# Patient Record
Sex: FEMALE | Race: BLACK OR AFRICAN AMERICAN | NOT HISPANIC OR LATINO | Employment: FULL TIME | URBAN - METROPOLITAN AREA
[De-identification: names, ages, dates, MRNs, and addresses within clinical notes are randomized per-mention and may not be internally consistent; named-entity substitution may affect disease eponyms.]

---

## 2018-05-01 ENCOUNTER — OFFICE VISIT (OUTPATIENT)
Dept: FAMILY MEDICINE CLINIC | Facility: CLINIC | Age: 48
End: 2018-05-01
Payer: COMMERCIAL

## 2018-05-01 VITALS
SYSTOLIC BLOOD PRESSURE: 114 MMHG | DIASTOLIC BLOOD PRESSURE: 78 MMHG | HEART RATE: 72 BPM | TEMPERATURE: 97.8 F | HEIGHT: 69 IN | RESPIRATION RATE: 14 BRPM | BODY MASS INDEX: 26.13 KG/M2 | WEIGHT: 176.4 LBS

## 2018-05-01 DIAGNOSIS — L50.8 CHRONIC URTICARIA: ICD-10-CM

## 2018-05-01 DIAGNOSIS — I10 ESSENTIAL HYPERTENSION: Primary | ICD-10-CM

## 2018-05-01 DIAGNOSIS — R23.2 HOT FLASHES: ICD-10-CM

## 2018-05-01 PROCEDURE — 3008F BODY MASS INDEX DOCD: CPT | Performed by: FAMILY MEDICINE

## 2018-05-01 PROCEDURE — 3074F SYST BP LT 130 MM HG: CPT | Performed by: FAMILY MEDICINE

## 2018-05-01 PROCEDURE — 99203 OFFICE O/P NEW LOW 30 MIN: CPT | Performed by: FAMILY MEDICINE

## 2018-05-01 PROCEDURE — 3078F DIAST BP <80 MM HG: CPT | Performed by: FAMILY MEDICINE

## 2018-05-01 RX ORDER — DESLORATADINE 5 MG/1
5 TABLET ORAL DAILY
Refills: 4 | COMMUNITY
Start: 2018-03-09 | End: 2020-03-23 | Stop reason: SDUPTHER

## 2018-05-01 RX ORDER — CLOBETASOL PROPIONATE 0.46 MG/ML
SOLUTION TOPICAL 2 TIMES DAILY
COMMUNITY
End: 2018-11-02 | Stop reason: ALTCHOICE

## 2018-05-01 RX ORDER — LISINOPRIL 20 MG/1
20 TABLET ORAL DAILY
Refills: 2 | COMMUNITY
Start: 2018-03-06 | End: 2019-04-01 | Stop reason: ALTCHOICE

## 2018-05-01 RX ORDER — TRETINOIN 0.5 MG/G
CREAM TOPICAL
COMMUNITY
End: 2019-04-01

## 2018-05-01 NOTE — PROGRESS NOTES
Patient ID: Bishnu Mantilla is a 46 y o  female  HPI: 46 y  o female presents to get established into practice  She has been having chronic urticaria for past 3 years, hot flashes even though she had a hysterectomy about 5 years ago and was taken off hormone replacement several years ago  She had genetic testing since her mother came from a family of 21 siblings with multiple cases of cancer and she tested negative  She recently had extensive labs by primary care, allergists, and ENT  She will be getting records for me  She is scheduled for a colonscopy as well  SUBJECTIVE    Family History   Problem Relation Age of Onset    Hypertension Mother     Hypertension Father     Breast cancer Maternal Aunt     Ovarian cancer Maternal Aunt     Colon cancer Maternal Uncle      Social History     Social History    Marital status: Single     Spouse name: N/A    Number of children: N/A    Years of education: N/A     Occupational History    Not on file       Social History Main Topics    Smoking status: Not on file    Smokeless tobacco: Not on file    Alcohol use Not on file    Drug use: Unknown    Sexual activity: Not on file     Other Topics Concern    Not on file     Social History Narrative    No narrative on file     Past Medical History:   Diagnosis Date    Acne     Allergic rhinitis     Chronic urticaria     Hypertension      Past Surgical History:   Procedure Laterality Date    APPENDECTOMY      HYSTERECTOMY      TOTAL ABDOMINAL HYSTERECTOMY W/ BILATERAL SALPINGOOPHORECTOMY       Allergies   Allergen Reactions    Morphine Rash       Current Outpatient Prescriptions:     clobetasol (TEMOVATE) 0 05 % external solution, Apply topically 2 (two) times a day, Disp: , Rfl:     desloratadine (CLARINEX) 5 MG tablet, Take 5 mg by mouth daily, Disp: , Rfl: 4    lisinopril (ZESTRIL) 10 mg tablet, Take 10 mg by mouth daily, Disp: , Rfl: 2    tretinoin (REFISSA) 0 05 % cream, Apply topically daily at bedtime, Disp: , Rfl:     Review of Systems  Constitutional:     Denies fever, chills ,fatigue ,weakness ,weight loss, weight gain     ENT: Denies earache ,loss of hearing ,nosebleed, nasal discharge,nasal congestion ,sore throat ,hoarseness  Pulmonary: Denies shortness of breath ,cough  ,dyspnea on exertion, orthopnea  ,PND   Cardiovascular:  Denies bradycardia , tachycardia  ,palpations, lower extremity edema leg, claudication  Breast:  Denies new or changing breast lumps ,nipple discharge ,nipple changes  Abdomen:  Denies abdominal pain , anorexia , indigestion, nausea, vomiting, constipation, diarrhea  Musculoskeletal: Denies myalgias, arthralgias, joint swelling, joint stiffness , limb pain, limb swelling  Gu: denies dysuria, polyuria  Skin: Denies skin rash, skin lesion, skin wound, itching, dry skin + urticaria  Neuro: Denies headache, numbness, tingling, confusion, loss of consciousness, dizziness, vertigo  Psychiatric: Denies feelings of depression, suicidal ideation, anxiety, sleep disturbances  + hot flashes   OBJECTIVE    Constitutional:   NAD, well appearing and well nourished      ENT:   Conjunctiva and lids: no injection, edema, or discharge     Pupils and iris: STALIN bilaterally    External inspection of ears and nose: normal without deformities or discharge  Otoscopic exam: Canals patent without erythema  Nasal mucosa, septum and turbinates: Normal or edema or discharge         Oropharynx:  Moist mucosa, normal tongue and tonsils without lesions  No erythema        Pulmonary:Respiratory effort normal rate and rhythm, no increased work of breathing   Auscultation of lungs:  Clear bilaterally with no adventitious breath sounds       Cardiovascular: regular rate and rhythm, S1 and S2, no murmur, no edema and/or varicosities of LE      Abdomen: Soft and non-distended     Positive bowel sounds      No heptomegaly or splenomegaly      Gu: no suprapubic tenderness or CVA tenderness, no urethral discharge  Lymphatic:  No anterior or posterior cervical lymphadenopathy         Musculoskeletal:  Gait and station: Normal gait      Digits and nails normal without clubbing or cyanosis       Inspection/palpation of joints, bones, and muscles:  No joint tenderness, swelling, full active and passive range of motion       Skin: Normal skin turgor and no rashes      Neuro:       Normal reflexes       Psych:   alert and oriented to person, place and time     normal mood and affect       Assessment/Plan:There are no diagnoses linked to this encounter  I will review pending labs and give pt my opinion regarding urticaria and hot flashes pending results

## 2018-06-25 ENCOUNTER — RX ONLY (RX ONLY)
Age: 48
End: 2018-06-25

## 2018-06-25 ENCOUNTER — DOCTOR'S OFFICE (OUTPATIENT)
Dept: URBAN - METROPOLITAN AREA CLINIC 137 | Facility: CLINIC | Age: 48
Setting detail: OPHTHALMOLOGY
End: 2018-06-25
Payer: COMMERCIAL

## 2018-06-25 DIAGNOSIS — H00.014: ICD-10-CM

## 2018-06-25 PROCEDURE — 99203 OFFICE O/P NEW LOW 30 MIN: CPT | Performed by: OPHTHALMOLOGY

## 2018-06-25 ASSESSMENT — CONFRONTATIONAL VISUAL FIELD TEST (CVF)
OD_FINDINGS: FULL
OS_FINDINGS: FULL

## 2018-06-26 ASSESSMENT — VISUAL ACUITY
OD_BCVA: 20/20
OS_BCVA: 20/20

## 2018-06-26 ASSESSMENT — REFRACTION_CURRENTRX
OS_OVR_VA: 20/
OD_OVR_VA: 20/
OD_OVR_VA: 20/
OS_OVR_VA: 20/
OS_OVR_VA: 20/
OD_OVR_VA: 20/

## 2018-06-26 ASSESSMENT — REFRACTION_MANIFEST
OD_VA2: 20/
OS_VA2: 20/
OD_VA3: 20/
OS_VA1: 20/
OU_VA: 20/
OD_VA1: 20/
OS_VA2: 20/
OS_VA1: 20/
OD_VA3: 20/
OD_VA1: 20/
OS_VA3: 20/
OS_VA1: 20/
OS_VA3: 20/
OS_VA2: 20/
OU_VA: 20/
OD_VA1: 20/
OD_VA2: 20/
OU_VA: 20/
OS_VA3: 20/
OD_VA3: 20/
OD_VA2: 20/

## 2018-08-08 ENCOUNTER — DOCTOR'S OFFICE (OUTPATIENT)
Dept: URBAN - METROPOLITAN AREA CLINIC 137 | Facility: CLINIC | Age: 48
Setting detail: OPHTHALMOLOGY
End: 2018-08-08
Payer: COMMERCIAL

## 2018-08-08 DIAGNOSIS — H04.123: ICD-10-CM

## 2018-08-08 PROCEDURE — 92014 COMPRE OPH EXAM EST PT 1/>: CPT | Performed by: OPHTHALMOLOGY

## 2018-08-08 ASSESSMENT — REFRACTION_MANIFEST
OD_VA3: 20/
OD_VA1: 20/
OS_VA1: 20/
OU_VA: 20/
OS_VA1: 20/
OD_VA2: 20/
OU_VA: 20/
OD_VA1: 20/
OD_VA2: 20/
OS_VA3: 20/
OD_VA2: 20/
OD_VA3: 20/
OS_VA3: 20/
OS_VA2: 20/
OS_VA2: 20/
OS_VA3: 20/
OD_VA1: 20/
OS_VA2: 20/
OD_VA3: 20/
OU_VA: 20/
OS_VA1: 20/

## 2018-08-08 ASSESSMENT — VISUAL ACUITY
OS_BCVA: 20/20
OD_BCVA: 20/20-2

## 2018-08-08 ASSESSMENT — REFRACTION_CURRENTRX
OS_SPHERE: -2.00
OD_VPRISM_DIRECTION: SV
OD_OVR_VA: 20/
OD_OVR_VA: 20/
OS_CYLINDER: SPH
OD_CYLINDER: SPH
OD_OVR_VA: 20/
OS_OVR_VA: 20/
OD_SPHERE: -1.75
OS_VPRISM_DIRECTION: SV

## 2018-08-08 ASSESSMENT — REFRACTION_AUTOREFRACTION
OD_SPHERE: -1.50
OS_CYLINDER: +0.25
OS_AXIS: 131
OD_CYLINDER: SPH
OS_SPHERE: -2.25

## 2018-08-08 ASSESSMENT — CONFRONTATIONAL VISUAL FIELD TEST (CVF)
OS_FINDINGS: FULL
OD_FINDINGS: FULL

## 2018-08-08 ASSESSMENT — SPHEQUIV_DERIVED: OS_SPHEQUIV: -2.125

## 2018-08-09 ENCOUNTER — OPTICAL OFFICE (OUTPATIENT)
Dept: URBAN - METROPOLITAN AREA CLINIC 146 | Facility: CLINIC | Age: 48
Setting detail: OPHTHALMOLOGY
End: 2018-08-09
Payer: COMMERCIAL

## 2018-08-09 DIAGNOSIS — H52.13: ICD-10-CM

## 2018-08-09 PROCEDURE — V2020 VISION SVCS FRAMES PURCHASES: HCPCS | Performed by: OPHTHALMOLOGY

## 2018-08-09 PROCEDURE — V2100 LENS SPHER SINGLE PLANO 4.00: HCPCS | Performed by: OPHTHALMOLOGY

## 2018-08-09 PROCEDURE — V2750 ANTI-REFLECTIVE COATING: HCPCS | Performed by: OPHTHALMOLOGY

## 2018-09-20 ENCOUNTER — DOCTOR'S OFFICE (OUTPATIENT)
Dept: URBAN - METROPOLITAN AREA CLINIC 136 | Facility: CLINIC | Age: 48
Setting detail: OPHTHALMOLOGY
End: 2018-09-20
Payer: COMMERCIAL

## 2018-09-20 DIAGNOSIS — H01.004: ICD-10-CM

## 2018-09-20 DIAGNOSIS — H01.001: ICD-10-CM

## 2018-09-20 DIAGNOSIS — H25.13: ICD-10-CM

## 2018-09-20 DIAGNOSIS — H05.89: ICD-10-CM

## 2018-09-20 DIAGNOSIS — H04.123: ICD-10-CM

## 2018-09-20 DIAGNOSIS — H10.13: ICD-10-CM

## 2018-09-20 PROCEDURE — 92012 INTRM OPH EXAM EST PATIENT: CPT | Performed by: OPHTHALMOLOGY

## 2018-09-20 ASSESSMENT — SUPERFICIAL PUNCTATE KERATITIS (SPK)
OS_SPK: ABSENT
OD_SPK: ABSENT

## 2018-09-20 ASSESSMENT — REFRACTION_CURRENTRX
OS_VPRISM_DIRECTION: SV
OS_OVR_VA: 20/
OD_VPRISM_DIRECTION: SV
OD_OVR_VA: 20/
OD_OVR_VA: 20/
OD_SPHERE: -1.75
OD_OVR_VA: 20/
OD_CYLINDER: SPH
OS_OVR_VA: 20/
OS_CYLINDER: SPH
OS_SPHERE: -2.00
OS_OVR_VA: 20/

## 2018-09-20 ASSESSMENT — LID EXAM ASSESSMENTS
OS_BLEPHARITIS: LUL 1+
OD_BLEPHARITIS: RUL 1+

## 2018-09-20 ASSESSMENT — CONFRONTATIONAL VISUAL FIELD TEST (CVF)
OD_FINDINGS: FULL
OS_FINDINGS: FULL

## 2018-09-20 ASSESSMENT — REFRACTION_MANIFEST
OD_VA3: 20/
OS_VA1: 20/
OD_VA1: 20/
OU_VA: 20/
OD_VA2: 20/
OD_VA1: 20/
OS_VA3: 20/
OS_VA3: 20/
OS_VA2: 20/
OD_VA3: 20/
OS_VA2: 20/
OS_VA1: 20/
OU_VA: 20/
OD_VA2: 20/

## 2018-09-20 ASSESSMENT — VISUAL ACUITY
OD_BCVA: 20/20-
OS_BCVA: 20/20

## 2018-09-20 ASSESSMENT — REFRACTION_AUTOREFRACTION
OS_AXIS: 131
OD_CYLINDER: SPH
OS_SPHERE: -2.25
OD_SPHERE: -1.50
OS_CYLINDER: +0.25

## 2018-09-20 ASSESSMENT — SPHEQUIV_DERIVED: OS_SPHEQUIV: -2.125

## 2018-11-02 ENCOUNTER — HOSPITAL ENCOUNTER (EMERGENCY)
Facility: HOSPITAL | Age: 48
Discharge: HOME | End: 2018-11-02
Attending: EMERGENCY MEDICINE | Admitting: EMERGENCY MEDICINE
Payer: COMMERCIAL

## 2018-11-02 ENCOUNTER — APPOINTMENT (EMERGENCY)
Dept: RADIOLOGY | Facility: HOSPITAL | Age: 48
End: 2018-11-02
Payer: COMMERCIAL

## 2018-11-02 ENCOUNTER — APPOINTMENT (EMERGENCY)
Dept: CT IMAGING | Facility: HOSPITAL | Age: 48
End: 2018-11-02
Payer: COMMERCIAL

## 2018-11-02 VITALS
HEART RATE: 78 BPM | SYSTOLIC BLOOD PRESSURE: 167 MMHG | TEMPERATURE: 98.2 F | WEIGHT: 174.16 LBS | BODY MASS INDEX: 25.72 KG/M2 | RESPIRATION RATE: 16 BRPM | DIASTOLIC BLOOD PRESSURE: 89 MMHG | OXYGEN SATURATION: 100 %

## 2018-11-02 DIAGNOSIS — F41.1 ANXIETY STATE: ICD-10-CM

## 2018-11-02 DIAGNOSIS — N28.1 RENAL CYST, RIGHT: ICD-10-CM

## 2018-11-02 DIAGNOSIS — R07.9 ACUTE CHEST PAIN: ICD-10-CM

## 2018-11-02 DIAGNOSIS — I16.0 HYPERTENSIVE URGENCY: Primary | ICD-10-CM

## 2018-11-02 LAB
ALBUMIN SERPL BCP-MCNC: 3.9 G/DL (ref 3.5–5)
ALP SERPL-CCNC: 76 U/L (ref 46–116)
ALT SERPL W P-5'-P-CCNC: 44 U/L (ref 12–78)
ANION GAP SERPL CALCULATED.3IONS-SCNC: 10 MMOL/L (ref 4–13)
APTT PPP: 29 SECONDS (ref 24–36)
AST SERPL W P-5'-P-CCNC: 14 U/L (ref 5–45)
BASOPHILS # BLD AUTO: 0.02 THOUSANDS/ΜL (ref 0–0.1)
BASOPHILS NFR BLD AUTO: 0 % (ref 0–1)
BILIRUB DIRECT SERPL-MCNC: 0.04 MG/DL (ref 0–0.2)
BILIRUB SERPL-MCNC: 0.2 MG/DL (ref 0.2–1)
BUN SERPL-MCNC: 13 MG/DL (ref 5–25)
CALCIUM SERPL-MCNC: 9.3 MG/DL (ref 8.3–10.1)
CHLORIDE SERPL-SCNC: 105 MMOL/L (ref 100–108)
CO2 SERPL-SCNC: 28 MMOL/L (ref 21–32)
CREAT SERPL-MCNC: 0.72 MG/DL (ref 0.6–1.3)
EOSINOPHIL # BLD AUTO: 0.21 THOUSAND/ΜL (ref 0–0.61)
EOSINOPHIL NFR BLD AUTO: 2 % (ref 0–6)
ERYTHROCYTE [DISTWIDTH] IN BLOOD BY AUTOMATED COUNT: 12.7 % (ref 11.6–15.1)
GFR SERPL CREATININE-BSD FRML MDRD: 113 ML/MIN/1.73SQ M
GLUCOSE SERPL-MCNC: 92 MG/DL (ref 65–140)
HCT VFR BLD AUTO: 40.1 % (ref 34.8–46.1)
HGB BLD-MCNC: 13.4 G/DL (ref 11.5–15.4)
IMM GRANULOCYTES # BLD AUTO: 0.03 THOUSAND/UL (ref 0–0.2)
IMM GRANULOCYTES NFR BLD AUTO: 0 % (ref 0–2)
INR PPP: 0.91 (ref 0.86–1.17)
LYMPHOCYTES # BLD AUTO: 3.14 THOUSANDS/ΜL (ref 0.6–4.47)
LYMPHOCYTES NFR BLD AUTO: 30 % (ref 14–44)
MCH RBC QN AUTO: 28.6 PG (ref 26.8–34.3)
MCHC RBC AUTO-ENTMCNC: 33.4 G/DL (ref 31.4–37.4)
MCV RBC AUTO: 86 FL (ref 82–98)
MONOCYTES # BLD AUTO: 0.77 THOUSAND/ΜL (ref 0.17–1.22)
MONOCYTES NFR BLD AUTO: 7 % (ref 4–12)
NEUTROPHILS # BLD AUTO: 6.36 THOUSANDS/ΜL (ref 1.85–7.62)
NEUTS SEG NFR BLD AUTO: 61 % (ref 43–75)
NRBC BLD AUTO-RTO: 0 /100 WBCS
NT-PROBNP SERPL-MCNC: 68 PG/ML
PLATELET # BLD AUTO: 226 THOUSANDS/UL (ref 149–390)
PMV BLD AUTO: 10.1 FL (ref 8.9–12.7)
POTASSIUM SERPL-SCNC: 3.7 MMOL/L (ref 3.5–5.3)
PROT SERPL-MCNC: 7.5 G/DL (ref 6.4–8.2)
PROTHROMBIN TIME: 12.4 SECONDS (ref 11.8–14.2)
RBC # BLD AUTO: 4.68 MILLION/UL (ref 3.81–5.12)
SODIUM SERPL-SCNC: 143 MMOL/L (ref 136–145)
TROPONIN I SERPL-MCNC: <0.02 NG/ML
WBC # BLD AUTO: 10.53 THOUSAND/UL (ref 4.31–10.16)

## 2018-11-02 PROCEDURE — 74175 CTA ABDOMEN W/CONTRAST: CPT

## 2018-11-02 PROCEDURE — 84484 ASSAY OF TROPONIN QUANT: CPT | Performed by: EMERGENCY MEDICINE

## 2018-11-02 PROCEDURE — 85610 PROTHROMBIN TIME: CPT | Performed by: EMERGENCY MEDICINE

## 2018-11-02 PROCEDURE — 36415 COLL VENOUS BLD VENIPUNCTURE: CPT | Performed by: EMERGENCY MEDICINE

## 2018-11-02 PROCEDURE — 71275 CT ANGIOGRAPHY CHEST: CPT

## 2018-11-02 PROCEDURE — 85730 THROMBOPLASTIN TIME PARTIAL: CPT | Performed by: EMERGENCY MEDICINE

## 2018-11-02 PROCEDURE — 80076 HEPATIC FUNCTION PANEL: CPT | Performed by: EMERGENCY MEDICINE

## 2018-11-02 PROCEDURE — 93005 ELECTROCARDIOGRAM TRACING: CPT

## 2018-11-02 PROCEDURE — 99285 EMERGENCY DEPT VISIT HI MDM: CPT

## 2018-11-02 PROCEDURE — 83880 ASSAY OF NATRIURETIC PEPTIDE: CPT | Performed by: EMERGENCY MEDICINE

## 2018-11-02 PROCEDURE — 71046 X-RAY EXAM CHEST 2 VIEWS: CPT

## 2018-11-02 PROCEDURE — 85025 COMPLETE CBC W/AUTO DIFF WBC: CPT | Performed by: EMERGENCY MEDICINE

## 2018-11-02 PROCEDURE — 80048 BASIC METABOLIC PNL TOTAL CA: CPT | Performed by: EMERGENCY MEDICINE

## 2018-11-02 RX ORDER — LABETALOL HYDROCHLORIDE 5 MG/ML
10 INJECTION, SOLUTION INTRAVENOUS ONCE
Status: DISCONTINUED | OUTPATIENT
Start: 2018-11-02 | End: 2018-11-02 | Stop reason: HOSPADM

## 2018-11-02 RX ADMIN — IOHEXOL 100 ML: 350 INJECTION, SOLUTION INTRAVENOUS at 16:06

## 2018-11-02 NOTE — ED PROVIDER NOTES
History  Chief Complaint   Patient presents with    Chest Pain     Pt c/o of left sided chest pain for the couple days  Pt states she was seen at [facility] and they did an EKG and then she waited in the waiting room so she left  History provided by:  Patient and relative  Chest Pain   Pain location:  L chest  Pain quality: sharp and stabbing    Pain radiates to:  Upper back  Pain radiates to the back: yes    Pain severity:  Moderate  Onset quality:  Gradual  Duration:  2 days  Timing:  Constant  Progression:  Worsening  Chronicity:  New  Context comment:  Atraumatic started suddenly soft PCP was advised come to ER  Relieved by:  Certain positions (Sitting)  Worsened by:  Certain positions  Ineffective treatments:  None tried  Associated symptoms: anxiety and shortness of breath    Associated symptoms: no abdominal pain, no cough, no diaphoresis, no dizziness, no fever, no headache, no nausea, no numbness, no palpitations and not vomiting    Risk factors: hypertension        Prior to Admission Medications   Prescriptions Last Dose Informant Patient Reported? Taking?    desloratadine (CLARINEX) 5 MG tablet Unknown at Unknown time Self Yes No   Sig: Take 5 mg by mouth daily   lisinopril (ZESTRIL) 20 mg tablet  Self Yes Yes   Sig: Take 20 mg by mouth daily     tretinoin (REFISSA) 0 05 % cream  Self Yes Yes   Sig: Apply topically daily at bedtime      Facility-Administered Medications: None       Past Medical History:   Diagnosis Date    Acne     Allergic rhinitis     Chronic urticaria     Hypertension        Past Surgical History:   Procedure Laterality Date    APPENDECTOMY      HYSTERECTOMY      TOTAL ABDOMINAL HYSTERECTOMY W/ BILATERAL SALPINGOOPHORECTOMY         Family History   Problem Relation Age of Onset    Hypertension Mother     Hypertension Father     Breast cancer Maternal Aunt     Ovarian cancer Maternal Aunt     Colon cancer Maternal Uncle      I have reviewed and agree with the history as documented  Social History   Substance Use Topics    Smoking status: Current Every Day Smoker     Packs/day: 0 50    Smokeless tobacco: Never Used    Alcohol use No        Review of Systems   Constitutional: Negative for activity change, chills, diaphoresis and fever  HENT: Negative for congestion, sinus pressure and sore throat  Eyes: Negative for pain and visual disturbance  Respiratory: Positive for shortness of breath  Negative for cough, chest tightness, wheezing and stridor  Cardiovascular: Positive for chest pain  Negative for palpitations  Gastrointestinal: Negative for abdominal distention, abdominal pain, constipation, diarrhea, nausea and vomiting  Genitourinary: Negative for dysuria and frequency  Musculoskeletal: Negative for neck pain and neck stiffness  Skin: Negative for rash  Neurological: Negative for dizziness, speech difficulty, light-headedness, numbness and headaches  Physical Exam  Physical Exam   Constitutional: She is oriented to person, place, and time  She appears well-developed  She appears distressed  HENT:   Head: Normocephalic and atraumatic  Eyes: Pupils are equal, round, and reactive to light  Neck: Normal range of motion  Neck supple  No tracheal deviation present  Cardiovascular: Normal rate, regular rhythm, normal heart sounds and intact distal pulses  No murmur heard  Pulmonary/Chest: Effort normal and breath sounds normal  No stridor  No respiratory distress  Abdominal: Soft  She exhibits no distension  There is no tenderness  There is no rebound and no guarding  Musculoskeletal: Normal range of motion  Neurological: She is alert and oriented to person, place, and time  Skin: Skin is warm and dry  She is not diaphoretic  No erythema  No pallor  Psychiatric: She has a normal mood and affect  Vitals reviewed        Vital Signs  ED Triage Vitals   Temperature Pulse Respirations Blood Pressure SpO2   11/02/18 1510 11/02/18 1507 11/02/18 1507 11/02/18 1507 11/02/18 1507   98 2 °F (36 8 °C) 78 16 (!) 197/107 100 %      Temp Source Heart Rate Source Patient Position - Orthostatic VS BP Location FiO2 (%)   11/02/18 1510 11/02/18 1507 11/02/18 1507 11/02/18 1507 --   Oral Monitor Lying Right arm       Pain Score       11/02/18 1507       4           Vitals:    11/02/18 1507 11/02/18 1540 11/02/18 1548 11/02/18 1600   BP: (!) 197/107 158/86 150/80 167/89   Pulse: 78 77 80 78   Patient Position - Orthostatic VS: Lying Sitting Sitting Sitting       Visual Acuity      ED Medications  Medications   labetalol (NORMODYNE) injection 10 mg (10 mg Intravenous Not Given 11/2/18 1549)   iohexol (OMNIPAQUE) 350 MG/ML injection (MULTI-DOSE) 100 mL (100 mL Intravenous Given 11/2/18 1606)       Diagnostic Studies  Results Reviewed     Procedure Component Value Units Date/Time    Hepatic function panel [[id number]]  (Normal) Collected:  11/02/18 1528    Lab Status:  Final result Specimen:  Blood from Arm, Right Updated:  11/02/18 1618     Total Bilirubin 0 20 mg/dL      Bilirubin, Direct 0 04 mg/dL      Alkaline Phosphatase 76 U/L      AST 14 U/L      ALT 44 U/L      Total Protein 7 5 g/dL      Albumin 3 9 g/dL     B-type natriuretic peptide [[id number]]  (Normal) Collected:  11/02/18 1528    Lab Status:  Final result Specimen:  Blood from Arm, Right Updated:  11/02/18 1558     NT-proBNP 68 pg/mL     Troponin I [[id number]]  (Normal) Collected:  11/02/18 1528    Lab Status:  Final result Specimen:  Blood from Arm, Right Updated:  11/02/18 1552     Troponin I <0 02 ng/mL     Basic metabolic panel [[id number]] Collected:  11/02/18 1528    Lab Status:  Final result Specimen:  Blood from Arm, Right Updated:  11/02/18 1550     Sodium 143 mmol/L      Potassium 3 7 mmol/L      Chloride 105 mmol/L      CO2 28 mmol/L      ANION GAP 10 mmol/L      BUN 13 mg/dL      Creatinine 0 72 mg/dL      Glucose 92 mg/dL      Calcium 9 3 mg/dL      eGFR 113 ml/min/1 73sq m Narrative:         National Kidney Disease Education Program recommendations are as follows:  GFR calculation is accurate only with a steady state creatinine  Chronic Kidney disease less than 60 ml/min/1 73 sq  meters  Kidney failure less than 15 ml/min/1 73 sq  meters  CBC and differential [[id number]]  (Abnormal) Collected:  11/02/18 1528    Lab Status:  Final result Specimen:  Blood from Arm, Right Updated:  11/02/18 1534     WBC 10 53 (H) Thousand/uL      RBC 4 68 Million/uL      Hemoglobin 13 4 g/dL      Hematocrit 40 1 %      MCV 86 fL      MCH 28 6 pg      MCHC 33 4 g/dL      RDW 12 7 %      MPV 10 1 fL      Platelets 321 Thousands/uL      nRBC 0 /100 WBCs      Neutrophils Relative 61 %      Immat GRANS % 0 %      Lymphocytes Relative 30 %      Monocytes Relative 7 %      Eosinophils Relative 2 %      Basophils Relative 0 %      Neutrophils Absolute 6 36 Thousands/µL      Immature Grans Absolute 0 03 Thousand/uL      Lymphocytes Absolute 3 14 Thousands/µL      Monocytes Absolute 0 77 Thousand/µL      Eosinophils Absolute 0 21 Thousand/µL      Basophils Absolute 0 02 Thousands/µL     Protime-INR [[id number]] Collected:  11/02/18 1528    Lab Status: In process Specimen:  Blood from Arm, Right Updated:  11/02/18 1531    APTT [[id number]] Collected:  11/02/18 1528    Lab Status: In process Specimen:  Blood from Arm, Right Updated:  11/02/18 1531                 CTA dissection protocol chest and abdomen   Final Result by Clay Pete MD (11/02 1624)      No evidence of aortic dissection or aortic aneurysm  No acute vascular pathology seen  Clear lungs  No acute abdominal pathology seen  3 mm indeterminate but probably benign right renal lesion most likely a cyst   There is a diverticulum of the colon without evidence of acute inflammation        No visible aspiration for back pain         Workstation performed: [id number]         XR chest 2 views   Final Result by Becky Stanford MD (11/02 1606)      No radiographic evidence of acute intrathoracic process  Workstation performed: [id number]                    Procedures  ECG 12 Lead Documentation  Date/Time: 11/2/2018 3:27 PM  Performed by: Elliott Edgar by: Mario Agrawal     ECG reviewed by me, the ED Provider: yes    Patient location:  ED  Interpretation:     Interpretation: normal    Rate:     ECG rate:  85    ECG rate assessment: normal    Rhythm:     Rhythm: sinus rhythm    Ectopy:     Ectopy: none    QRS:     QRS axis:  Normal    QRS intervals:  Normal  Conduction:     Conduction: normal    ST segments:     ST segments:  Normal  T waves:     T waves: normal             Phone Contacts  ED Phone Contact    ED Course  ED Course as of Nov 02 1658 Fri Nov 02, 2018   1655 Very long conversation with patient she feels greatly improved    She wants to be discharged stating she feels well she agrees to follow with outpatient PCP for stress test and follow-up blood pressure and renal cyst                                MDM  Number of Diagnoses or Management Options  Acute chest pain: new and requires workup  Anxiety state: new and requires workup  Hypertensive urgency: new and requires workup  Renal cyst, right: new and requires workup  Diagnosis management comments:       Initial ED assessment:  49-year-old female presents with chest pain with radiation to the back and dyspnea    Initial DDx includes but is not limited to:   ACS, aortic dissection, pulmonary embolism, hypertensive urgency, musculoskeletal pain with anxiety    Initial ED plan:   Blood work, CTA chest abdomen pelvis to evaluate for dissection and pulmonary embolism delta 3 hr troponin        Final ED summary/disposition:   After evaluation and workup in the emergency department,   patient does not want stay for delta 3 hr troponin blood pressure decreased without intervention she agrees to follow up PCP for outpatient stress test and blood pressure evaluations       Amount and/or Complexity of Data Reviewed  Clinical lab tests: reviewed and ordered  Tests in the radiology section of CPT®: ordered and reviewed  Decide to obtain previous medical records or to obtain history from someone other than the patient: yes  Obtain history from someone other than the patient: yes  Review and summarize past medical records: yes  Independent visualization of images, tracings, or specimens: yes      CritCare Time    Disposition  Final diagnoses:   Hypertensive urgency   Acute chest pain   Anxiety state   Renal cyst, right     Time reflects when diagnosis was documented in both MDM as applicable and the Disposition within this note     Time User Action Codes Description Comment    11/2/2018  4:56 PM Lexus MORALES Add [I16 0] Hypertensive urgency     11/2/2018  4:56 PM Lexus MORALES Add [R07 9] Acute chest pain     11/2/2018  4:56 PM Rowan Grant Add [F41 1] Anxiety state     11/2/2018  4:56 PM Rowan Grant Add [N28 1] Renal cyst, right       ED Disposition     ED Disposition Condition Comment    Discharge  Pj Cox discharge to home/self care  Condition at discharge: Good        Follow-up Information     Follow up With Specialties Details Why [facility] Call in 1 day To arrange for the next available appointment  To follow up outpatient renal cyst as well as to arrange for an outpatient stress test [id number]  [phone]            Patient's Medications   Discharge Prescriptions    No medications on file     No discharge procedures on file      ED Provider  Electronically Signed by           Xochitl Ray DO  11/02/18 0942

## 2018-11-02 NOTE — DISCHARGE INSTRUCTIONS

## 2018-11-02 NOTE — ED NOTES
Provider aware of patient VS and approved holding labetalol        Marielle Hairston RN  11/02/18 1186

## 2018-11-04 LAB
ATRIAL RATE: 85 BPM
P AXIS: 65 DEGREES
PR INTERVAL: 158 MS
QRS AXIS: 21 DEGREES
QRSD INTERVAL: 88 MS
QT INTERVAL: 384 MS
QTC INTERVAL: 456 MS
T WAVE AXIS: 39 DEGREES
VENTRICULAR RATE: 85 BPM

## 2018-11-04 PROCEDURE — 93010 ELECTROCARDIOGRAM REPORT: CPT | Performed by: INTERNAL MEDICINE

## 2019-04-04 DIAGNOSIS — Z12.39 SCREENING FOR BREAST CANCER: Primary | ICD-10-CM

## 2019-04-05 ENCOUNTER — HOSPITAL ENCOUNTER (EMERGENCY)
Facility: HOSPITAL | Age: 49
Discharge: HOME | End: 2019-04-05
Attending: EMERGENCY MEDICINE | Admitting: EMERGENCY MEDICINE
Payer: COMMERCIAL

## 2019-04-05 ENCOUNTER — APPOINTMENT (EMERGENCY)
Dept: CT IMAGING | Facility: HOSPITAL | Age: 49
End: 2019-04-05
Payer: COMMERCIAL

## 2019-04-05 ENCOUNTER — APPOINTMENT (EMERGENCY)
Dept: RADIOLOGY | Facility: HOSPITAL | Age: 49
End: 2019-04-05
Payer: COMMERCIAL

## 2019-04-05 VITALS
DIASTOLIC BLOOD PRESSURE: 60 MMHG | OXYGEN SATURATION: 100 % | RESPIRATION RATE: 18 BRPM | TEMPERATURE: 97.5 F | HEART RATE: 92 BPM | BODY MASS INDEX: 26.37 KG/M2 | WEIGHT: 178.57 LBS | SYSTOLIC BLOOD PRESSURE: 123 MMHG

## 2019-04-05 DIAGNOSIS — R07.89 ATYPICAL CHEST PAIN: Primary | ICD-10-CM

## 2019-04-05 LAB
ALBUMIN SERPL BCP-MCNC: 4 G/DL (ref 3.5–5)
ALP SERPL-CCNC: 76 U/L (ref 46–116)
ALT SERPL W P-5'-P-CCNC: 37 U/L (ref 12–78)
ANION GAP SERPL CALCULATED.3IONS-SCNC: 11 MMOL/L (ref 4–13)
APTT PPP: 30 SECONDS (ref 26–38)
AST SERPL W P-5'-P-CCNC: 17 U/L (ref 5–45)
ATRIAL RATE: 103 BPM
BASOPHILS # BLD AUTO: 0.06 THOUSANDS/ΜL (ref 0–0.1)
BASOPHILS NFR BLD AUTO: 1 % (ref 0–1)
BILIRUB SERPL-MCNC: 0.4 MG/DL (ref 0.2–1)
BUN SERPL-MCNC: 17 MG/DL (ref 5–25)
CALCIUM SERPL-MCNC: 9.4 MG/DL (ref 8.3–10.1)
CHLORIDE SERPL-SCNC: 105 MMOL/L (ref 100–108)
CO2 SERPL-SCNC: 24 MMOL/L (ref 21–32)
CREAT SERPL-MCNC: 0.9 MG/DL (ref 0.6–1.3)
DEPRECATED D DIMER PPP: 545 NG/ML (FEU)
EOSINOPHIL # BLD AUTO: 0.16 THOUSAND/ΜL (ref 0–0.61)
EOSINOPHIL NFR BLD AUTO: 2 % (ref 0–6)
ERYTHROCYTE [DISTWIDTH] IN BLOOD BY AUTOMATED COUNT: 12.7 % (ref 11.6–15.1)
GFR SERPL CREATININE-BSD FRML MDRD: 86 ML/MIN/1.73SQ M
GLUCOSE SERPL-MCNC: 111 MG/DL (ref 65–140)
HCT VFR BLD AUTO: 40.8 % (ref 34.8–46.1)
HGB BLD-MCNC: 13.9 G/DL (ref 11.5–15.4)
IMM GRANULOCYTES # BLD AUTO: 0.05 THOUSAND/UL (ref 0–0.2)
IMM GRANULOCYTES NFR BLD AUTO: 1 % (ref 0–2)
INR PPP: 0.95 (ref 0.86–1.17)
LYMPHOCYTES # BLD AUTO: 2.48 THOUSANDS/ΜL (ref 0.6–4.47)
LYMPHOCYTES NFR BLD AUTO: 24 % (ref 14–44)
MCH RBC QN AUTO: 28.4 PG (ref 26.8–34.3)
MCHC RBC AUTO-ENTMCNC: 34.1 G/DL (ref 31.4–37.4)
MCV RBC AUTO: 83 FL (ref 82–98)
MONOCYTES # BLD AUTO: 0.54 THOUSAND/ΜL (ref 0.17–1.22)
MONOCYTES NFR BLD AUTO: 5 % (ref 4–12)
NEUTROPHILS # BLD AUTO: 7.24 THOUSANDS/ΜL (ref 1.85–7.62)
NEUTS SEG NFR BLD AUTO: 67 % (ref 43–75)
NRBC BLD AUTO-RTO: 0 /100 WBCS
P AXIS: 68 DEGREES
PLATELET # BLD AUTO: 266 THOUSANDS/UL (ref 149–390)
PMV BLD AUTO: 10.7 FL (ref 8.9–12.7)
POTASSIUM SERPL-SCNC: 3.7 MMOL/L (ref 3.5–5.3)
PR INTERVAL: 136 MS
PROT SERPL-MCNC: 7.5 G/DL (ref 6.4–8.2)
PROTHROMBIN TIME: 12.4 SECONDS (ref 11.8–14.2)
QRS AXIS: 33 DEGREES
QRSD INTERVAL: 86 MS
QT INTERVAL: 354 MS
QTC INTERVAL: 463 MS
RBC # BLD AUTO: 4.89 MILLION/UL (ref 3.81–5.12)
SODIUM SERPL-SCNC: 140 MMOL/L (ref 136–145)
T WAVE AXIS: 56 DEGREES
TROPONIN I SERPL-MCNC: <0.02 NG/ML
TROPONIN I SERPL-MCNC: <0.02 NG/ML
VENTRICULAR RATE: 103 BPM
WBC # BLD AUTO: 10.53 THOUSAND/UL (ref 4.31–10.16)

## 2019-04-05 PROCEDURE — 85610 PROTHROMBIN TIME: CPT | Performed by: EMERGENCY MEDICINE

## 2019-04-05 PROCEDURE — 71275 CT ANGIOGRAPHY CHEST: CPT

## 2019-04-05 PROCEDURE — 84484 ASSAY OF TROPONIN QUANT: CPT | Performed by: EMERGENCY MEDICINE

## 2019-04-05 PROCEDURE — 93005 ELECTROCARDIOGRAM TRACING: CPT

## 2019-04-05 PROCEDURE — 96360 HYDRATION IV INFUSION INIT: CPT

## 2019-04-05 PROCEDURE — 36415 COLL VENOUS BLD VENIPUNCTURE: CPT

## 2019-04-05 PROCEDURE — 85379 FIBRIN DEGRADATION QUANT: CPT | Performed by: EMERGENCY MEDICINE

## 2019-04-05 PROCEDURE — 93010 ELECTROCARDIOGRAM REPORT: CPT | Performed by: INTERNAL MEDICINE

## 2019-04-05 PROCEDURE — 85730 THROMBOPLASTIN TIME PARTIAL: CPT | Performed by: EMERGENCY MEDICINE

## 2019-04-05 PROCEDURE — 80053 COMPREHEN METABOLIC PANEL: CPT | Performed by: EMERGENCY MEDICINE

## 2019-04-05 PROCEDURE — 71046 X-RAY EXAM CHEST 2 VIEWS: CPT

## 2019-04-05 PROCEDURE — 99285 EMERGENCY DEPT VISIT HI MDM: CPT

## 2019-04-05 PROCEDURE — 85025 COMPLETE CBC W/AUTO DIFF WBC: CPT | Performed by: EMERGENCY MEDICINE

## 2019-04-05 PROCEDURE — 99284 EMERGENCY DEPT VISIT MOD MDM: CPT | Performed by: EMERGENCY MEDICINE

## 2019-04-05 RX ORDER — OXYCODONE HYDROCHLORIDE AND ACETAMINOPHEN 5; 325 MG/1; MG/1
1 TABLET ORAL ONCE
Status: CANCELLED | OUTPATIENT
Start: 2019-04-05 | End: 2019-04-05

## 2019-04-05 RX ORDER — ACETAMINOPHEN 325 MG/1
650 TABLET ORAL ONCE
Status: COMPLETED | OUTPATIENT
Start: 2019-04-05 | End: 2019-04-05

## 2019-04-05 RX ORDER — ASPIRIN 81 MG/1
324 TABLET, CHEWABLE ORAL ONCE
Status: COMPLETED | OUTPATIENT
Start: 2019-04-05 | End: 2019-04-05

## 2019-04-05 RX ORDER — AMLODIPINE BESYLATE 5 MG/1
5 TABLET ORAL ONCE
Status: COMPLETED | OUTPATIENT
Start: 2019-04-05 | End: 2019-04-05

## 2019-04-05 RX ORDER — SPIRONOLACTONE 25 MG/1
50 TABLET ORAL ONCE
Status: DISCONTINUED | OUTPATIENT
Start: 2019-04-05 | End: 2019-04-05 | Stop reason: HOSPADM

## 2019-04-05 RX ORDER — SODIUM CHLORIDE, SODIUM LACTATE, POTASSIUM CHLORIDE, CALCIUM CHLORIDE 600; 310; 30; 20 MG/100ML; MG/100ML; MG/100ML; MG/100ML
500 INJECTION, SOLUTION INTRAVENOUS CONTINUOUS
Status: DISCONTINUED | OUTPATIENT
Start: 2019-04-05 | End: 2019-04-05 | Stop reason: HOSPADM

## 2019-04-05 RX ADMIN — AMLODIPINE BESYLATE 5 MG: 5 TABLET ORAL at 14:56

## 2019-04-05 RX ADMIN — ASPIRIN 81 MG 324 MG: 81 TABLET ORAL at 14:02

## 2019-04-05 RX ADMIN — IOHEXOL 85 ML: 350 INJECTION, SOLUTION INTRAVENOUS at 16:17

## 2019-04-05 RX ADMIN — SODIUM CHLORIDE, SODIUM LACTATE, POTASSIUM CHLORIDE, AND CALCIUM CHLORIDE 500 ML: .6; .31; .03; .02 INJECTION, SOLUTION INTRAVENOUS at 16:09

## 2019-04-05 RX ADMIN — ACETAMINOPHEN 650 MG: 325 TABLET, FILM COATED ORAL at 14:56

## 2019-04-06 LAB
ATRIAL RATE: 90 BPM
P AXIS: 60 DEGREES
PR INTERVAL: 148 MS
QRS AXIS: 21 DEGREES
QRSD INTERVAL: 86 MS
QT INTERVAL: 356 MS
QTC INTERVAL: 435 MS
T WAVE AXIS: 31 DEGREES
VENTRICULAR RATE: 90 BPM

## 2019-04-06 PROCEDURE — 93010 ELECTROCARDIOGRAM REPORT: CPT | Performed by: INTERNAL MEDICINE

## 2019-04-12 ENCOUNTER — HOSPITAL ENCOUNTER (OUTPATIENT)
Dept: CT IMAGING | Facility: HOSPITAL | Age: 49
Discharge: HOME | End: 2019-04-12
Attending: OTOLARYNGOLOGY
Payer: COMMERCIAL

## 2019-04-12 DIAGNOSIS — K11.1 ENLARGED PAROTID GLAND: ICD-10-CM

## 2019-04-12 DIAGNOSIS — R13.10 DYSPHAGIA, UNSPECIFIED TYPE: ICD-10-CM

## 2019-04-12 PROCEDURE — 70491 CT SOFT TISSUE NECK W/DYE: CPT

## 2019-04-12 RX ADMIN — IOHEXOL 85 ML: 350 INJECTION, SOLUTION INTRAVENOUS at 19:20

## 2019-06-06 ENCOUNTER — DOCTOR'S OFFICE (OUTPATIENT)
Dept: URBAN - METROPOLITAN AREA CLINIC 136 | Facility: CLINIC | Age: 49
Setting detail: OPHTHALMOLOGY
End: 2019-06-06
Payer: COMMERCIAL

## 2019-06-06 DIAGNOSIS — H04.123: ICD-10-CM

## 2019-06-06 DIAGNOSIS — H01.001: ICD-10-CM

## 2019-06-06 DIAGNOSIS — H10.13: ICD-10-CM

## 2019-06-06 DIAGNOSIS — Z83.511: ICD-10-CM

## 2019-06-06 DIAGNOSIS — H05.89: ICD-10-CM

## 2019-06-06 DIAGNOSIS — H40.013: ICD-10-CM

## 2019-06-06 DIAGNOSIS — H25.13: ICD-10-CM

## 2019-06-06 DIAGNOSIS — H01.004: ICD-10-CM

## 2019-06-06 PROCEDURE — 92014 COMPRE OPH EXAM EST PT 1/>: CPT | Performed by: OPHTHALMOLOGY

## 2019-06-06 PROCEDURE — 92133 CPTRZD OPH DX IMG PST SGM ON: CPT | Performed by: OPHTHALMOLOGY

## 2019-06-06 PROCEDURE — 76514 ECHO EXAM OF EYE THICKNESS: CPT | Performed by: OPHTHALMOLOGY

## 2019-06-06 ASSESSMENT — CONFRONTATIONAL VISUAL FIELD TEST (CVF)
OD_FINDINGS: FULL
OS_FINDINGS: FULL

## 2019-06-06 ASSESSMENT — REFRACTION_MANIFEST
OD_VA3: 20/
OS_VA3: 20/
OU_VA: 20/
OD_VA1: 20/
OD_VA3: 20/
OS_VA1: 20/
OS_VA2: 20/
OD_VA2: 20/
OS_VA1: 20/
OS_VA3: 20/
OD_VA1: 20/
OS_VA2: 20/
OU_VA: 20/
OD_VA2: 20/

## 2019-06-06 ASSESSMENT — REFRACTION_AUTOREFRACTION
OD_CYLINDER: SPH
OS_SPHERE: -2.25
OS_CYLINDER: +0.25
OS_AXIS: 131
OD_SPHERE: -1.50

## 2019-06-06 ASSESSMENT — REFRACTION_CURRENTRX
OS_SPHERE: -2.00
OD_VPRISM_DIRECTION: SV
OS_VPRISM_DIRECTION: SV
OD_OVR_VA: 20/
OS_OVR_VA: 20/
OD_OVR_VA: 20/
OD_CYLINDER: SPH
OD_SPHERE: -1.75
OS_OVR_VA: 20/
OS_OVR_VA: 20/
OS_CYLINDER: SPH
OD_OVR_VA: 20/

## 2019-06-06 ASSESSMENT — LID EXAM ASSESSMENTS
OS_BLEPHARITIS: LUL 1+
OD_BLEPHARITIS: RUL 1+

## 2019-06-06 ASSESSMENT — SUPERFICIAL PUNCTATE KERATITIS (SPK)
OS_SPK: ABSENT
OD_SPK: ABSENT

## 2019-06-06 ASSESSMENT — VISUAL ACUITY
OS_BCVA: 20/20
OD_BCVA: 20/20

## 2019-06-06 ASSESSMENT — SPHEQUIV_DERIVED: OS_SPHEQUIV: -2.125

## 2019-10-03 ENCOUNTER — OFFICE VISIT (OUTPATIENT)
Dept: OBGYN CLINIC | Facility: CLINIC | Age: 49
End: 2019-10-03
Payer: COMMERCIAL

## 2019-10-03 VITALS
HEIGHT: 69 IN | SYSTOLIC BLOOD PRESSURE: 120 MMHG | DIASTOLIC BLOOD PRESSURE: 78 MMHG | BODY MASS INDEX: 27.99 KG/M2 | WEIGHT: 189 LBS

## 2019-10-03 DIAGNOSIS — N95.1 VASOMOTOR SYMPTOMS DUE TO MENOPAUSE: ICD-10-CM

## 2019-10-03 DIAGNOSIS — Z01.419 ENCOUNTER FOR GYNECOLOGICAL EXAMINATION (GENERAL) (ROUTINE) WITHOUT ABNORMAL FINDINGS: Primary | ICD-10-CM

## 2019-10-03 PROCEDURE — 87624 HPV HI-RISK TYP POOLED RSLT: CPT | Performed by: PHYSICIAN ASSISTANT

## 2019-10-03 PROCEDURE — G0145 SCR C/V CYTO,THINLAYER,RESCR: HCPCS | Performed by: PHYSICIAN ASSISTANT

## 2019-10-03 PROCEDURE — S0610 ANNUAL GYNECOLOGICAL EXAMINA: HCPCS | Performed by: PHYSICIAN ASSISTANT

## 2019-10-03 RX ORDER — CHOLECALCIFEROL (VITAMIN D3) 1250 MCG
CAPSULE ORAL
COMMUNITY
End: 2021-06-02

## 2019-10-03 NOTE — ASSESSMENT & PLAN NOTE
Discussed menopause & associated symptoms  Reviewed treatment options: allow natural course without treatment and implement lifestyle management strategies - encourage patient to continue with her regular exercise, smoking cessation; Can consider re-trial of OTC supplements like black cohosh, evening primrose oil, Remifemin, or Estroven  Reviewed low dose serotonin reuptake inhibitors (like Brisdelle) as a non-hormonal treatment options for hot flashes  Can consider HRT again if desires as well  Patient plans to re-try supplements and call/RTO if desires a trial of a prescription medication

## 2019-10-03 NOTE — ASSESSMENT & PLAN NOTE
The current ASCCP guidelines were reviewed  Patient's last pap was a couple years ago and patient has a history of multiple abnormals and therefore, a pap with HPV cotesting is indicated at this time considering patient is new to us and I don't have access to her records at this time  I emphasized the importance of an annual pelvic and breast exam  Patient ok to have a pap done today

## 2019-10-03 NOTE — PROGRESS NOTES
Assessment/Plan   Diagnoses and all orders for this visit:    Encounter for gynecological examination (general) (routine) without abnormal findings   - Liquid based pap smear screening  The current ASCCP guidelines were reviewed  Patient's last pap was a couple years ago and patient has a history of multiple abnormals and therefore, a pap with HPV cotesting is indicated at this time considering patient is new to us and I don't have access to her records at this time  I emphasized the importance of an annual pelvic and breast exam  Patient ok to have a pap done today  Vasomotor symptoms due to menopause  Discussed menopause & associated symptoms  Reviewed treatment options: allow natural course without treatment and implement lifestyle management strategies - encourage patient to continue with her regular exercise, smoking cessation; Can consider re-trial of OTC supplements like black cohosh, evening primrose oil, Remifemin, or Estroven  Reviewed low dose serotonin reuptake inhibitors (like Brisdelle) as a non-hormonal treatment options for hot flashes  Can consider HRT again if desires as well  Patient plans to re-try supplements and call/RTO if desires a trial of a prescription medication  Discussion  I have discussed the importance of monthly self-breast exams, exercise and healthy diet as well as adequate intake of calcium and vitamin D  Encourage at least 1200 mg calcium citrate + 2000 IUs vitamin D3 divided through diet and supplement throughout the day; Encourage 30-40 min weight bearing exercise most days of week  STD testing -   A yearly mammogram is recommended for breast cancer screening starting at age 36 - patient states results sent to PCP and she gets done every year; In addition, colon cancer screening with a colonoscopy is recommended starting at age 39 and reviewed benefits  -patient is up to date with screening and due for follow-up age 46    Commended patient on quitting smoking  All questions have been answered to her satisfaction  RTO for APE or sooner if needed    Subjective     HPI   Aelxa Mcgee is a 44 y o  female who presents for annual well woman exam    LMP - age 27 s/p partial hysterectomy and then 2 years later had a JASON BSO; Denies any vaginal/ bleeding  No vulvar itch/burn; No vaginal itch/burn; No abn discharge or odor; No urinary sx - burning/pain/hematuria; (+) urinary frequency - especially with increase in hydration and working out;  (+) SBEs - no breast masses, asymmetry, nipple discharge or bleeding, changes in skin of breast, or breast tenderness bilaterally  No abd/pelvic pain or HAs;   (+) menopausal symptoms: (+) hot flashes/night sweats - every day and every night - used to take Premarin from age 37 for many years and unsure when she stopped but just remembers she was told she wasn't allowed to take any longer; Used to take black cohosh but found it wasn't helping so discontinued about 3 years ago  (+) vaginal dryness; sleeping poor due to nocturia, hot flashes/night sweats  Pt is not currently sexually active for many years now; She declines std/hiv/hep testing; Feels safe at home  (+) PCP for routine Bw/care; Last Pap - many years ago - unsure results; History of abnormal Pap smear: many abnormals in her past - history of many colpos - last colpo was very painful and never went back to her gynecologist  Last mammo - 7/2019 - WNL per patient - ordered through PCP  History of abnormal mammogram: once and WNL on ultrasound follow-up  Last colonoscopy - second one done age 46 - polyps and diverticulosis - needs to follow-up in 3 years  Last Dexa scan - none    Review of Systems   Constitutional: Negative for activity change, fatigue, fever and unexpected weight change  HENT: Negative for congestion, dental problem, sinus pressure and sinus pain  Eyes: Negative for visual disturbance  Respiratory: Negative for cough, shortness of breath and wheezing  Cardiovascular: Negative for chest pain and leg swelling  Gastrointestinal: Negative for abdominal distention, abdominal pain, blood in stool, constipation, diarrhea, nausea and vomiting  Endocrine: Negative for polydipsia  Genitourinary: Negative for difficulty urinating, dyspareunia, dysuria, frequency, hematuria, menstrual problem, pelvic pain, urgency, vaginal bleeding, vaginal discharge and vaginal pain  Musculoskeletal: Negative for arthralgias and back pain  Allergic/Immunologic: Negative for environmental allergies  Neurological: Negative for dizziness, seizures and headaches  Psychiatric/Behavioral: Negative for dysphoric mood and sleep disturbance  The patient is not nervous/anxious          The following portions of the patient's history were reviewed and updated as appropriate: allergies, current medications, past family history, past medical history, past social history, past surgical history and problem list          OB History        1    Para        Term                AB   1    Living           SAB        TAB        Ectopic        Multiple        Live Births               Obstetric Comments   : [address] [facility]             Past Medical History:   Diagnosis Date    Acne     Allergic rhinitis     Chronic urticaria     Hypertension     Nasal congestion        Past Surgical History:   Procedure Laterality Date    APPENDECTOMY      HYSTERECTOMY      TOTAL ABDOMINAL HYSTERECTOMY W/ BILATERAL SALPINGOOPHORECTOMY         Family History   Problem Relation Age of Onset    Hypertension Mother     Hypertension Father     Breast cancer Maternal Aunt     Ovarian cancer Maternal Aunt     Diabetes Maternal Aunt     Colon cancer Maternal Uncle        Social History     Socioeconomic History    Marital status: Single     Spouse name: Not on file    Number of children: Not on file    Years of education: Not on file    Highest education level: Not on file   Occupational History    Not on file   Social Needs    Financial resource strain: Not on file    Food insecurity:     Worry: Not on file     Inability: Not on file    Transportation needs:     Medical: Not on file     Non-medical: Not on file   Tobacco Use    Smoking status: Former Smoker     Packs/day: 0 50    Smokeless tobacco: Never Used   Substance and Sexual Activity    Alcohol use: Yes     Comment: social    Drug use: No    Sexual activity: Not Currently   Lifestyle    Physical activity:     Days per week: Not on file     Minutes per session: Not on file    Stress: Not on file   Relationships    Social connections:     Talks on phone: Not on file     Gets together: Not on file     Attends [employer] service: Not on file     Active member of club or organization: Not on file     Attends meetings of clubs or organizations: Not on file     Relationship status: Not on file    Intimate partner violence:     Fear of current or ex partner: Not on file     Emotionally abused: Not on file     Physically abused: Not on file     Forced sexual activity: Not on file   Other Topics Concern    Not on file   Social History Narrative    Not on file         Current Outpatient Medications:     amLODIPine (NORVASC) 5 mg tablet, TK 1 T PO QD, Disp: , Rfl: 6    desloratadine (CLARINEX) 5 MG tablet, Take 5 mg by mouth daily, Disp: , Rfl: 4    spironolactone (ALDACTONE) 50 mg tablet, TAKE 1 TABLET BY MOUTH DAILY FOR 2 WEEKS  THEN INCREASE TO 1 TABLET TWICE A DAY THEREAFTER , Disp: , Rfl: 1    Sulfacetamide Sodium, Acne, 10 % LOTN, , Disp: , Rfl:     Allergies   Allergen Reactions    Morphine Rash    Other      Environmental, Nuts       Objective   Vitals:    10/03/19 0806   BP: 120/78   BP Location: Left arm   Patient Position: Sitting   Cuff Size: Large   Weight: 85 7 kg (189 lb)   Height: 5' 9" (1 753 m)     Physical Exam   Constitutional: She appears well-developed and well-nourished  No distress     Neck: No thyromegaly present  Cardiovascular: Normal rate, regular rhythm and normal heart sounds  No murmur heard  Pulmonary/Chest: Effort normal and breath sounds normal  No respiratory distress  She has no wheezes  Right breast exhibits no inverted nipple, no mass, no nipple discharge, no skin change and no tenderness  Left breast exhibits no inverted nipple, no mass, no nipple discharge, no skin change and no tenderness  Breasts are symmetrical    Abdominal: Soft  She exhibits no distension and no mass  There is no tenderness  Genitourinary: Vagina normal  Rectal exam shows no mass, no tenderness, anal tone normal and guaiac negative stool  Pelvic exam was performed with patient supine  There is no rash, tenderness or lesion on the right labia  There is no rash, tenderness or lesion on the left labia  No erythema, tenderness or bleeding in the vagina  No foreign body in the vagina  No vaginal discharge found  Genitourinary Comments: Moderate-severe vaginal atrophy noted  Uterus, cervix, adnexa, and fallopian tubes surgically absent   Musculoskeletal: She exhibits no edema  Lymphadenopathy:     She has no cervical adenopathy  Right: No inguinal adenopathy present  Left: No inguinal adenopathy present  Neurological: She is alert  Skin: Skin is warm  She is not diaphoretic  Psychiatric: She has a normal mood and affect  Her behavior is normal    Vitals reviewed

## 2019-10-04 LAB
HPV HR 12 DNA CVX QL NAA+PROBE: POSITIVE
HPV16 DNA CVX QL NAA+PROBE: NEGATIVE
HPV18 DNA CVX QL NAA+PROBE: NEGATIVE

## 2019-10-09 LAB
LAB AP GYN PRIMARY INTERPRETATION: NORMAL
Lab: NORMAL

## 2019-10-29 ENCOUNTER — TRANSCRIBE ORDERS (OUTPATIENT)
Dept: ADMINISTRATIVE | Facility: HOSPITAL | Age: 49
End: 2019-10-29

## 2019-10-29 DIAGNOSIS — E04.9 ENLARGEMENT OF THYROID: Primary | ICD-10-CM

## 2019-10-30 ENCOUNTER — HOSPITAL ENCOUNTER (OUTPATIENT)
Dept: ULTRASOUND IMAGING | Facility: HOSPITAL | Age: 49
Discharge: HOME | End: 2019-10-30
Payer: COMMERCIAL

## 2019-10-30 DIAGNOSIS — E04.9 ENLARGEMENT OF THYROID: ICD-10-CM

## 2019-10-30 PROCEDURE — 76536 US EXAM OF HEAD AND NECK: CPT

## 2019-12-05 ENCOUNTER — DOCTOR'S OFFICE (OUTPATIENT)
Dept: URBAN - METROPOLITAN AREA CLINIC 136 | Facility: CLINIC | Age: 49
Setting detail: OPHTHALMOLOGY
End: 2019-12-05
Payer: COMMERCIAL

## 2019-12-05 DIAGNOSIS — H40.013: ICD-10-CM

## 2019-12-05 DIAGNOSIS — Z83.511: ICD-10-CM

## 2019-12-05 DIAGNOSIS — H25.13: ICD-10-CM

## 2019-12-05 DIAGNOSIS — H04.123: ICD-10-CM

## 2019-12-05 DIAGNOSIS — H10.13: ICD-10-CM

## 2019-12-05 PROBLEM — H05.89: Status: ACTIVE | Noted: 2018-09-20

## 2019-12-05 PROBLEM — H01.004 BLEPHARITIS; RIGHT UPPER LID, LEFT UPPER LID: Status: ACTIVE | Noted: 2018-09-20

## 2019-12-05 PROBLEM — H01.001 BLEPHARITIS; RIGHT UPPER LID, LEFT UPPER LID: Status: ACTIVE | Noted: 2018-09-20

## 2019-12-05 PROCEDURE — 92083 EXTENDED VISUAL FIELD XM: CPT | Performed by: OPHTHALMOLOGY

## 2019-12-05 PROCEDURE — 92014 COMPRE OPH EXAM EST PT 1/>: CPT | Performed by: OPHTHALMOLOGY

## 2019-12-05 ASSESSMENT — SUPERFICIAL PUNCTATE KERATITIS (SPK)
OD_SPK: ABSENT
OS_SPK: ABSENT

## 2019-12-05 ASSESSMENT — CONFRONTATIONAL VISUAL FIELD TEST (CVF)
OD_FINDINGS: FULL
OS_FINDINGS: FULL

## 2019-12-05 ASSESSMENT — REFRACTION_AUTOREFRACTION
OS_CYLINDER: +0.25
OS_AXIS: 131
OD_SPHERE: -1.50
OS_SPHERE: -2.25
OD_CYLINDER: SPH

## 2019-12-05 ASSESSMENT — REFRACTION_CURRENTRX
OS_SPHERE: -2.00
OD_OVR_VA: 20/
OS_OVR_VA: 20/
OD_OVR_VA: 20/
OS_OVR_VA: 20/
OD_OVR_VA: 20/
OD_VPRISM_DIRECTION: SV
OS_VPRISM_DIRECTION: SV
OD_CYLINDER: SPH
OD_SPHERE: -1.75
OS_OVR_VA: 20/
OS_CYLINDER: SPH

## 2019-12-05 ASSESSMENT — LID EXAM ASSESSMENTS
OS_BLEPHARITIS: LUL 1+
OD_BLEPHARITIS: RUL 1+

## 2019-12-05 ASSESSMENT — VISUAL ACUITY
OD_BCVA: 20/20
OS_BCVA: 20/20-1

## 2019-12-05 ASSESSMENT — REFRACTION_MANIFEST
OD_VA3: 20/
OD_VA1: 20/
OS_VA2: 20/
OU_VA: 20/
OS_VA1: 20/
OS_VA2: 20/
OD_VA2: 20/
OU_VA: 20/
OD_VA2: 20/
OD_VA1: 20/
OS_VA3: 20/
OS_VA3: 20/
OD_VA3: 20/
OS_VA1: 20/

## 2019-12-05 ASSESSMENT — SPHEQUIV_DERIVED: OS_SPHEQUIV: -2.125

## 2020-07-02 ENCOUNTER — TELEPHONE (OUTPATIENT)
Dept: INTERNAL MEDICINE CLINIC | Facility: CLINIC | Age: 50
End: 2020-07-02

## 2020-07-02 DIAGNOSIS — J45.30 MILD PERSISTENT ASTHMA, UNSPECIFIED WHETHER COMPLICATED: Primary | ICD-10-CM

## 2020-07-02 PROBLEM — R06.09 DYSPNEA ON EXERTION: Status: ACTIVE | Noted: 2020-06-11

## 2020-07-02 PROBLEM — I47.29 NSVT (NONSUSTAINED VENTRICULAR TACHYCARDIA): Status: ACTIVE | Noted: 2020-03-11

## 2020-07-02 PROBLEM — I47.2 NSVT (NONSUSTAINED VENTRICULAR TACHYCARDIA) (HCC): Status: ACTIVE | Noted: 2020-03-11

## 2020-07-02 PROBLEM — I10 HYPERTENSION, ESSENTIAL: Status: ACTIVE | Noted: 2020-01-23

## 2020-07-02 PROBLEM — Z91.09 ALLERGY TO ENVIRONMENTAL FACTORS: Status: ACTIVE | Noted: 2020-06-11

## 2020-07-02 PROBLEM — R06.00 DYSPNEA ON EXERTION: Status: ACTIVE | Noted: 2020-06-11

## 2020-07-02 NOTE — TELEPHONE ENCOUNTER
Pt called stating she had received sample of spiriva and would like a refill sent to her pharmacy ([facility])  She does not have med on her med list but pharmacy has been updated  She would also like a call back when this has been completed  Thank you

## 2020-07-02 NOTE — TELEPHONE ENCOUNTER
I spoke to the patient  [place] to benefit with Spiriva 1 25 2 inhalations once a day  Wants to continue  Previously she received a sample from our office    Reordered for 30 days through [facility]

## 2020-07-03 PROBLEM — L20.84 INTRINSIC ATOPIC DERMATITIS: Status: ACTIVE | Noted: 2020-07-03

## 2020-07-03 PROBLEM — J30.89 ALLERGIC RHINITIS DUE TO HOUSE DUST MITE: Status: ACTIVE | Noted: 2020-07-03

## 2020-07-03 PROBLEM — J38.3 VOCAL CORD DYSFUNCTION: Status: ACTIVE | Noted: 2020-07-03

## 2020-07-03 PROBLEM — R76.8 ELEVATED IGE LEVEL: Status: ACTIVE | Noted: 2020-07-03

## 2020-07-03 PROBLEM — H10.13 ALLERGIC CONJUNCTIVITIS OF BOTH EYES: Status: ACTIVE | Noted: 2020-07-03

## 2020-07-03 PROBLEM — J30.1 CHRONIC SEASONAL ALLERGIC RHINITIS DUE TO POLLEN: Status: ACTIVE | Noted: 2020-07-03

## 2020-07-07 ENCOUNTER — TRANSCRIBE ORDERS (OUTPATIENT)
Dept: ADMINISTRATIVE | Facility: HOSPITAL | Age: 50
End: 2020-07-07

## 2020-07-07 DIAGNOSIS — R06.00 DYSPNEA ON EXERTION: Primary | ICD-10-CM

## 2020-07-07 DIAGNOSIS — R06.89 HYPOVENTILATION, IDIOPATHIC: Primary | ICD-10-CM

## 2020-07-12 ENCOUNTER — HOSPITAL ENCOUNTER (OUTPATIENT)
Dept: RADIOLOGY | Facility: HOSPITAL | Age: 50
Discharge: HOME | End: 2020-07-12
Payer: COMMERCIAL

## 2020-07-12 ENCOUNTER — HOSPITAL ENCOUNTER (OUTPATIENT)
Dept: NUCLEAR MEDICINE | Facility: HOSPITAL | Age: 50
Discharge: HOME | End: 2020-07-12
Payer: COMMERCIAL

## 2020-07-12 DIAGNOSIS — R06.00 DYSPNEA ON EXERTION: ICD-10-CM

## 2020-07-12 DIAGNOSIS — R06.02 SOB (SHORTNESS OF BREATH) ON EXERTION: ICD-10-CM

## 2020-07-12 PROCEDURE — 78580 LUNG PERFUSION IMAGING: CPT

## 2020-07-12 PROCEDURE — A9540 TC99M MAA: HCPCS

## 2020-07-12 PROCEDURE — 71046 X-RAY EXAM CHEST 2 VIEWS: CPT

## 2020-10-06 ENCOUNTER — TELEPHONE (OUTPATIENT)
Dept: FAMILY MEDICINE CLINIC | Facility: CLINIC | Age: 50
End: 2020-10-06

## 2020-10-06 ENCOUNTER — TRANSCRIBE ORDERS (OUTPATIENT)
Dept: ADMINISTRATIVE | Facility: HOSPITAL | Age: 50
End: 2020-10-06

## 2020-10-06 DIAGNOSIS — Z12.31 ENCOUNTER FOR SCREENING MAMMOGRAM FOR MALIGNANT NEOPLASM OF BREAST: Primary | ICD-10-CM

## 2020-10-08 ENCOUNTER — HOSPITAL ENCOUNTER (OUTPATIENT)
Dept: RADIOLOGY | Facility: HOSPITAL | Age: 50
Discharge: HOME | End: 2020-10-08
Attending: FAMILY MEDICINE

## 2020-11-04 ENCOUNTER — HOSPITAL ENCOUNTER (OUTPATIENT)
Dept: RADIOLOGY | Facility: HOSPITAL | Age: 50
Discharge: HOME | End: 2020-11-04
Attending: FAMILY MEDICINE
Payer: COMMERCIAL

## 2020-11-04 DIAGNOSIS — Z12.31 ENCOUNTER FOR SCREENING MAMMOGRAM FOR MALIGNANT NEOPLASM OF BREAST: ICD-10-CM

## 2020-11-04 PROCEDURE — 77067 SCR MAMMO BI INCL CAD: CPT

## 2020-11-04 PROCEDURE — 77063 BREAST TOMOSYNTHESIS BI: CPT

## 2020-12-14 PROBLEM — Z01.419 ROUTINE GYNECOLOGICAL EXAMINATION: Status: ACTIVE | Noted: 2020-12-14

## 2020-12-14 PROBLEM — J45.20 MILD INTERMITTENT ASTHMA WITHOUT COMPLICATION: Status: ACTIVE | Noted: 2020-07-06

## 2020-12-14 RX ORDER — BUDESONIDE AND FORMOTEROL FUMARATE DIHYDRATE 80; 4.5 UG/1; UG/1
2 AEROSOL RESPIRATORY (INHALATION) 2 TIMES DAILY
COMMUNITY
Start: 2020-08-10 | End: 2021-06-02

## 2020-12-14 RX ORDER — LEVALBUTEROL TARTRATE 45 UG/1
1-2 AEROSOL, METERED ORAL
COMMUNITY
Start: 2020-07-06 | End: 2021-06-02

## 2020-12-15 ENCOUNTER — TELEPHONE (OUTPATIENT)
Dept: ADMINISTRATIVE | Facility: OTHER | Age: 50
End: 2020-12-15

## 2020-12-15 ENCOUNTER — ANNUAL EXAM (OUTPATIENT)
Dept: OBGYN CLINIC | Facility: CLINIC | Age: 50
End: 2020-12-15
Payer: COMMERCIAL

## 2020-12-15 VITALS
HEIGHT: 69 IN | BODY MASS INDEX: 30.96 KG/M2 | DIASTOLIC BLOOD PRESSURE: 84 MMHG | SYSTOLIC BLOOD PRESSURE: 130 MMHG | WEIGHT: 209 LBS

## 2020-12-15 DIAGNOSIS — Z01.419 ROUTINE GYNECOLOGICAL EXAMINATION: Primary | ICD-10-CM

## 2020-12-15 DIAGNOSIS — Z12.31 ENCOUNTER FOR SCREENING MAMMOGRAM FOR MALIGNANT NEOPLASM OF BREAST: ICD-10-CM

## 2020-12-15 DIAGNOSIS — N39.3 STRESS INCONTINENCE: ICD-10-CM

## 2020-12-15 DIAGNOSIS — B97.7 HPV IN FEMALE: ICD-10-CM

## 2020-12-15 PROCEDURE — S0612 ANNUAL GYNECOLOGICAL EXAMINA: HCPCS | Performed by: PHYSICIAN ASSISTANT

## 2020-12-15 PROCEDURE — 87624 HPV HI-RISK TYP POOLED RSLT: CPT | Performed by: PHYSICIAN ASSISTANT

## 2020-12-15 PROCEDURE — G0145 SCR C/V CYTO,THINLAYER,RESCR: HCPCS | Performed by: PHYSICIAN ASSISTANT

## 2020-12-15 RX ORDER — ESCITALOPRAM OXALATE 10 MG/1
10 TABLET ORAL DAILY
COMMUNITY
Start: 2020-11-25

## 2020-12-15 RX ORDER — METOPROLOL SUCCINATE 50 MG/1
50 TABLET, EXTENDED RELEASE ORAL DAILY
COMMUNITY
Start: 2020-11-25

## 2020-12-15 RX ORDER — SULFAMETHOXAZOLE AND TRIMETHOPRIM 800; 160 MG/1; MG/1
TABLET ORAL
COMMUNITY
Start: 2020-10-12 | End: 2021-06-02

## 2020-12-21 LAB
LAB AP GYN PRIMARY INTERPRETATION: NORMAL
Lab: NORMAL

## 2021-03-03 ENCOUNTER — TRANSCRIBE ORDERS (OUTPATIENT)
Dept: ADMINISTRATIVE | Facility: HOSPITAL | Age: 51
End: 2021-03-03

## 2021-03-03 ENCOUNTER — LAB (OUTPATIENT)
Dept: LAB | Facility: HOSPITAL | Age: 51
End: 2021-03-03
Payer: COMMERCIAL

## 2021-03-03 DIAGNOSIS — E55.9 VITAMIN D DEFICIENCY: ICD-10-CM

## 2021-03-03 DIAGNOSIS — I10 HYPERTENSION, ESSENTIAL: Primary | ICD-10-CM

## 2021-03-03 DIAGNOSIS — I10 HYPERTENSION, ESSENTIAL: ICD-10-CM

## 2021-03-03 LAB
25(OH)D3 SERPL-MCNC: 22.1 NG/ML (ref 30–100)
ALBUMIN SERPL BCP-MCNC: 4.2 G/DL (ref 3.4–4.8)
ALP SERPL-CCNC: 75.9 U/L (ref 35–140)
ALT SERPL W P-5'-P-CCNC: 25 U/L (ref 5–54)
ANION GAP SERPL CALCULATED.3IONS-SCNC: 8 MMOL/L (ref 4–13)
AST SERPL W P-5'-P-CCNC: 16 U/L (ref 15–41)
BILIRUB SERPL-MCNC: 0.26 MG/DL (ref 0.3–1.2)
BUN SERPL-MCNC: 14 MG/DL (ref 6–20)
CALCIUM SERPL-MCNC: 9.1 MG/DL (ref 8.4–10.2)
CHLORIDE SERPL-SCNC: 105 MMOL/L (ref 96–108)
CO2 SERPL-SCNC: 25 MMOL/L (ref 22–33)
CREAT SERPL-MCNC: 0.81 MG/DL (ref 0.4–1.1)
GFR SERPL CREATININE-BSD FRML MDRD: 97 ML/MIN/1.73SQ M
GLUCOSE P FAST SERPL-MCNC: 111 MG/DL (ref 70–105)
POTASSIUM SERPL-SCNC: 3.9 MMOL/L (ref 3.5–5)
PROT SERPL-MCNC: 7.3 G/DL (ref 6.4–8.3)
SODIUM SERPL-SCNC: 138 MMOL/L (ref 133–145)

## 2021-03-03 PROCEDURE — 80053 COMPREHEN METABOLIC PANEL: CPT

## 2021-03-03 PROCEDURE — 36415 COLL VENOUS BLD VENIPUNCTURE: CPT

## 2021-03-03 PROCEDURE — 82306 VITAMIN D 25 HYDROXY: CPT

## 2021-03-27 ENCOUNTER — IMMUNIZATIONS (OUTPATIENT)
Dept: FAMILY MEDICINE CLINIC | Facility: HOSPITAL | Age: 51
End: 2021-03-27

## 2021-03-27 DIAGNOSIS — Z23 ENCOUNTER FOR IMMUNIZATION: Primary | ICD-10-CM

## 2021-03-27 PROCEDURE — 91300 SARS-COV-2 / COVID-19 MRNA VACCINE (PFIZER-BIONTECH) 30 MCG: CPT

## 2021-03-27 PROCEDURE — 0001A SARS-COV-2 / COVID-19 MRNA VACCINE (PFIZER-BIONTECH) 30 MCG: CPT

## 2021-04-17 ENCOUNTER — IMMUNIZATIONS (OUTPATIENT)
Dept: FAMILY MEDICINE CLINIC | Facility: HOSPITAL | Age: 51
End: 2021-04-17

## 2021-04-17 DIAGNOSIS — Z23 ENCOUNTER FOR IMMUNIZATION: Primary | ICD-10-CM

## 2021-04-17 PROCEDURE — 91300 SARS-COV-2 / COVID-19 MRNA VACCINE (PFIZER-BIONTECH) 30 MCG: CPT

## 2021-04-17 PROCEDURE — 0002A SARS-COV-2 / COVID-19 MRNA VACCINE (PFIZER-BIONTECH) 30 MCG: CPT

## 2021-05-11 ENCOUNTER — APPOINTMENT (EMERGENCY)
Dept: ULTRASOUND IMAGING | Facility: HOSPITAL | Age: 51
End: 2021-05-11
Payer: COMMERCIAL

## 2021-05-11 ENCOUNTER — ANESTHESIA (OUTPATIENT)
Dept: PERIOP | Facility: HOSPITAL | Age: 51
End: 2021-05-11
Payer: COMMERCIAL

## 2021-05-11 ENCOUNTER — HOSPITAL ENCOUNTER (OUTPATIENT)
Facility: HOSPITAL | Age: 51
Setting detail: OBSERVATION
Discharge: HOME | End: 2021-05-11
Attending: EMERGENCY MEDICINE | Admitting: SURGERY
Payer: COMMERCIAL

## 2021-05-11 ENCOUNTER — ANESTHESIA EVENT (OUTPATIENT)
Dept: PERIOP | Facility: HOSPITAL | Age: 51
End: 2021-05-11
Payer: COMMERCIAL

## 2021-05-11 ENCOUNTER — APPOINTMENT (EMERGENCY)
Dept: CT IMAGING | Facility: HOSPITAL | Age: 51
End: 2021-05-11
Payer: COMMERCIAL

## 2021-05-11 ENCOUNTER — APPOINTMENT (EMERGENCY)
Dept: RADIOLOGY | Facility: HOSPITAL | Age: 51
End: 2021-05-11
Payer: COMMERCIAL

## 2021-05-11 VITALS
SYSTOLIC BLOOD PRESSURE: 123 MMHG | DIASTOLIC BLOOD PRESSURE: 58 MMHG | HEART RATE: 83 BPM | OXYGEN SATURATION: 96 % | TEMPERATURE: 97 F | RESPIRATION RATE: 18 BRPM | BODY MASS INDEX: 31.29 KG/M2 | WEIGHT: 211.86 LBS

## 2021-05-11 DIAGNOSIS — K81.0 ACUTE CHOLECYSTITIS: ICD-10-CM

## 2021-05-11 DIAGNOSIS — R11.2 NAUSEA & VOMITING: ICD-10-CM

## 2021-05-11 DIAGNOSIS — K80.00 ACUTE CALCULOUS CHOLECYSTITIS: Primary | ICD-10-CM

## 2021-05-11 PROBLEM — K21.9 GASTROESOPHAGEAL REFLUX DISEASE: Status: ACTIVE | Noted: 2021-05-11

## 2021-05-11 LAB
ALBUMIN SERPL BCP-MCNC: 3.7 G/DL (ref 3.5–5)
ALP SERPL-CCNC: 81 U/L (ref 46–116)
ALT SERPL W P-5'-P-CCNC: 36 U/L (ref 12–78)
ANION GAP SERPL CALCULATED.3IONS-SCNC: 7 MMOL/L (ref 4–13)
APTT PPP: 27 SECONDS (ref 23–37)
AST SERPL W P-5'-P-CCNC: 47 U/L (ref 5–45)
ATRIAL RATE: 72 BPM
BACTERIA UR QL AUTO: NORMAL /HPF
BASOPHILS # BLD AUTO: 0.04 THOUSANDS/ΜL (ref 0–0.1)
BASOPHILS NFR BLD AUTO: 1 % (ref 0–1)
BILIRUB SERPL-MCNC: 0.3 MG/DL (ref 0.2–1)
BILIRUB UR QL STRIP: NEGATIVE
BUN SERPL-MCNC: 19 MG/DL (ref 5–25)
CALCIUM SERPL-MCNC: 9 MG/DL (ref 8.3–10.1)
CHLORIDE SERPL-SCNC: 104 MMOL/L (ref 100–108)
CLARITY UR: CLEAR
CO2 SERPL-SCNC: 28 MMOL/L (ref 21–32)
COLOR UR: YELLOW
CREAT SERPL-MCNC: 0.85 MG/DL (ref 0.6–1.3)
EOSINOPHIL # BLD AUTO: 0.1 THOUSAND/ΜL (ref 0–0.61)
EOSINOPHIL NFR BLD AUTO: 1 % (ref 0–6)
ERYTHROCYTE [DISTWIDTH] IN BLOOD BY AUTOMATED COUNT: 12.7 % (ref 11.6–15.1)
GFR SERPL CREATININE-BSD FRML MDRD: 90 ML/MIN/1.73SQ M
GLUCOSE SERPL-MCNC: 144 MG/DL (ref 65–140)
GLUCOSE UR STRIP-MCNC: NEGATIVE MG/DL
HCT VFR BLD AUTO: 40.1 % (ref 34.8–46.1)
HGB BLD-MCNC: 12.9 G/DL (ref 11.5–15.4)
HGB UR QL STRIP.AUTO: ABNORMAL
IMM GRANULOCYTES # BLD AUTO: 0.05 THOUSAND/UL (ref 0–0.2)
IMM GRANULOCYTES NFR BLD AUTO: 1 % (ref 0–2)
INR PPP: 0.95 (ref 0.84–1.19)
KETONES UR STRIP-MCNC: NEGATIVE MG/DL
LACTATE SERPL-SCNC: 1.7 MMOL/L (ref 0.5–2)
LEUKOCYTE ESTERASE UR QL STRIP: NEGATIVE
LIPASE SERPL-CCNC: 57 U/L (ref 73–393)
LYMPHOCYTES # BLD AUTO: 2.08 THOUSANDS/ΜL (ref 0.6–4.47)
LYMPHOCYTES NFR BLD AUTO: 28 % (ref 14–44)
MAGNESIUM SERPL-MCNC: 1.9 MG/DL (ref 1.6–2.6)
MCH RBC QN AUTO: 27.8 PG (ref 26.8–34.3)
MCHC RBC AUTO-ENTMCNC: 32.2 G/DL (ref 31.4–37.4)
MCV RBC AUTO: 86 FL (ref 82–98)
MONOCYTES # BLD AUTO: 0.55 THOUSAND/ΜL (ref 0.17–1.22)
MONOCYTES NFR BLD AUTO: 7 % (ref 4–12)
NEUTROPHILS # BLD AUTO: 4.6 THOUSANDS/ΜL (ref 1.85–7.62)
NEUTS SEG NFR BLD AUTO: 62 % (ref 43–75)
NITRITE UR QL STRIP: NEGATIVE
NON-SQ EPI CELLS URNS QL MICRO: NORMAL /HPF
NRBC BLD AUTO-RTO: 0 /100 WBCS
P AXIS: 69 DEGREES
PH UR STRIP.AUTO: 6 [PH]
PLATELET # BLD AUTO: 235 THOUSANDS/UL (ref 149–390)
PMV BLD AUTO: 10.7 FL (ref 8.9–12.7)
POTASSIUM SERPL-SCNC: 4.9 MMOL/L (ref 3.5–5.3)
PR INTERVAL: 160 MS
PROT SERPL-MCNC: 8 G/DL (ref 6.4–8.2)
PROT UR STRIP-MCNC: NEGATIVE MG/DL
PROTHROMBIN TIME: 12.8 SECONDS (ref 11.6–14.5)
QRS AXIS: 42 DEGREES
QRSD INTERVAL: 88 MS
QT INTERVAL: 396 MS
QTC INTERVAL: 425 MS
RBC # BLD AUTO: 4.64 MILLION/UL (ref 3.81–5.12)
RBC #/AREA URNS AUTO: NORMAL /HPF
SODIUM SERPL-SCNC: 139 MMOL/L (ref 136–145)
SP GR UR STRIP.AUTO: <=1.005 (ref 1–1.03)
T WAVE AXIS: 30 DEGREES
TROPONIN I SERPL-MCNC: <0.02 NG/ML
UROBILINOGEN UR QL STRIP.AUTO: 0.2 E.U./DL
VENTRICULAR RATE: 69 BPM
WBC # BLD AUTO: 7.42 THOUSAND/UL (ref 4.31–10.16)
WBC #/AREA URNS AUTO: NORMAL /HPF

## 2021-05-11 PROCEDURE — 96375 TX/PRO/DX INJ NEW DRUG ADDON: CPT

## 2021-05-11 PROCEDURE — 99285 EMERGENCY DEPT VISIT HI MDM: CPT | Performed by: PHYSICIAN ASSISTANT

## 2021-05-11 PROCEDURE — 88304 TISSUE EXAM BY PATHOLOGIST: CPT | Performed by: PATHOLOGY

## 2021-05-11 PROCEDURE — 74177 CT ABD & PELVIS W/CONTRAST: CPT

## 2021-05-11 PROCEDURE — 85025 COMPLETE CBC W/AUTO DIFF WBC: CPT | Performed by: PHYSICIAN ASSISTANT

## 2021-05-11 PROCEDURE — 83690 ASSAY OF LIPASE: CPT | Performed by: PHYSICIAN ASSISTANT

## 2021-05-11 PROCEDURE — 94664 DEMO&/EVAL PT USE INHALER: CPT

## 2021-05-11 PROCEDURE — G1004 CDSM NDSC: HCPCS

## 2021-05-11 PROCEDURE — 96361 HYDRATE IV INFUSION ADD-ON: CPT

## 2021-05-11 PROCEDURE — 99220 PR INITIAL OBSERVATION CARE/DAY 70 MINUTES: CPT | Performed by: SURGERY

## 2021-05-11 PROCEDURE — 85610 PROTHROMBIN TIME: CPT | Performed by: PHYSICIAN ASSISTANT

## 2021-05-11 PROCEDURE — 71045 X-RAY EXAM CHEST 1 VIEW: CPT

## 2021-05-11 PROCEDURE — 84484 ASSAY OF TROPONIN QUANT: CPT | Performed by: PHYSICIAN ASSISTANT

## 2021-05-11 PROCEDURE — 47562 LAPAROSCOPIC CHOLECYSTECTOMY: CPT | Performed by: SURGERY

## 2021-05-11 PROCEDURE — 80053 COMPREHEN METABOLIC PANEL: CPT | Performed by: PHYSICIAN ASSISTANT

## 2021-05-11 PROCEDURE — 93010 ELECTROCARDIOGRAM REPORT: CPT | Performed by: INTERNAL MEDICINE

## 2021-05-11 PROCEDURE — 83735 ASSAY OF MAGNESIUM: CPT | Performed by: PHYSICIAN ASSISTANT

## 2021-05-11 PROCEDURE — 99285 EMERGENCY DEPT VISIT HI MDM: CPT

## 2021-05-11 PROCEDURE — 85730 THROMBOPLASTIN TIME PARTIAL: CPT | Performed by: PHYSICIAN ASSISTANT

## 2021-05-11 PROCEDURE — 76705 ECHO EXAM OF ABDOMEN: CPT

## 2021-05-11 PROCEDURE — 93005 ELECTROCARDIOGRAM TRACING: CPT

## 2021-05-11 PROCEDURE — 36415 COLL VENOUS BLD VENIPUNCTURE: CPT | Performed by: PHYSICIAN ASSISTANT

## 2021-05-11 PROCEDURE — 81001 URINALYSIS AUTO W/SCOPE: CPT | Performed by: PHYSICIAN ASSISTANT

## 2021-05-11 PROCEDURE — 83605 ASSAY OF LACTIC ACID: CPT | Performed by: PHYSICIAN ASSISTANT

## 2021-05-11 PROCEDURE — 96365 THER/PROPH/DIAG IV INF INIT: CPT

## 2021-05-11 RX ORDER — CEFAZOLIN SODIUM 2 G/50ML
2000 SOLUTION INTRAVENOUS EVERY 8 HOURS
Status: DISCONTINUED | OUTPATIENT
Start: 2021-05-11 | End: 2021-05-11 | Stop reason: HOSPADM

## 2021-05-11 RX ORDER — SODIUM CHLORIDE 9 MG/ML
INJECTION, SOLUTION INTRAVENOUS AS NEEDED
Status: DISCONTINUED | OUTPATIENT
Start: 2021-05-11 | End: 2021-05-11 | Stop reason: HOSPADM

## 2021-05-11 RX ORDER — PROPOFOL 10 MG/ML
INJECTION, EMULSION INTRAVENOUS AS NEEDED
Status: DISCONTINUED | OUTPATIENT
Start: 2021-05-11 | End: 2021-05-11

## 2021-05-11 RX ORDER — DIPHENHYDRAMINE HYDROCHLORIDE 50 MG/ML
25 INJECTION INTRAMUSCULAR; INTRAVENOUS ONCE
Status: COMPLETED | OUTPATIENT
Start: 2021-05-11 | End: 2021-05-11

## 2021-05-11 RX ORDER — ONDANSETRON 2 MG/ML
4 INJECTION INTRAMUSCULAR; INTRAVENOUS EVERY 4 HOURS PRN
Status: DISCONTINUED | OUTPATIENT
Start: 2021-05-11 | End: 2021-05-11 | Stop reason: HOSPADM

## 2021-05-11 RX ORDER — MIDAZOLAM HYDROCHLORIDE 2 MG/2ML
INJECTION, SOLUTION INTRAMUSCULAR; INTRAVENOUS AS NEEDED
Status: DISCONTINUED | OUTPATIENT
Start: 2021-05-11 | End: 2021-05-11

## 2021-05-11 RX ORDER — DIPHENHYDRAMINE HYDROCHLORIDE 50 MG/ML
25 INJECTION INTRAMUSCULAR; INTRAVENOUS ONCE
Status: DISCONTINUED | OUTPATIENT
Start: 2021-05-11 | End: 2021-05-11 | Stop reason: HOSPADM

## 2021-05-11 RX ORDER — HYDROMORPHONE HCL/PF 1 MG/ML
0.5 SYRINGE (ML) INJECTION
Status: DISCONTINUED | OUTPATIENT
Start: 2021-05-11 | End: 2021-05-11 | Stop reason: HOSPADM

## 2021-05-11 RX ORDER — OXYCODONE HYDROCHLORIDE 5 MG/1
5 TABLET ORAL EVERY 4 HOURS PRN
Qty: 10 TABLET | Refills: 0 | Status: SHIPPED | OUTPATIENT
Start: 2021-05-11 | End: 2021-05-21

## 2021-05-11 RX ORDER — ACETAMINOPHEN 325 MG/1
650 TABLET ORAL EVERY 6 HOURS PRN
Status: DISCONTINUED | OUTPATIENT
Start: 2021-05-11 | End: 2021-05-11 | Stop reason: HOSPADM

## 2021-05-11 RX ORDER — GLYCOPYRROLATE 0.2 MG/ML
INJECTION INTRAMUSCULAR; INTRAVENOUS AS NEEDED
Status: DISCONTINUED | OUTPATIENT
Start: 2021-05-11 | End: 2021-05-11

## 2021-05-11 RX ORDER — MAGNESIUM HYDROXIDE/ALUMINUM HYDROXICE/SIMETHICONE 120; 1200; 1200 MG/30ML; MG/30ML; MG/30ML
30 SUSPENSION ORAL ONCE
Status: COMPLETED | OUTPATIENT
Start: 2021-05-11 | End: 2021-05-11

## 2021-05-11 RX ORDER — METOPROLOL TARTRATE 5 MG/5ML
INJECTION INTRAVENOUS AS NEEDED
Status: DISCONTINUED | OUTPATIENT
Start: 2021-05-11 | End: 2021-05-11

## 2021-05-11 RX ORDER — EPHEDRINE SULFATE 50 MG/ML
INJECTION INTRAVENOUS AS NEEDED
Status: DISCONTINUED | OUTPATIENT
Start: 2021-05-11 | End: 2021-05-11

## 2021-05-11 RX ORDER — ONDANSETRON 2 MG/ML
INJECTION INTRAMUSCULAR; INTRAVENOUS AS NEEDED
Status: DISCONTINUED | OUTPATIENT
Start: 2021-05-11 | End: 2021-05-11

## 2021-05-11 RX ORDER — NEOSTIGMINE METHYLSULFATE 1 MG/ML
INJECTION INTRAVENOUS AS NEEDED
Status: DISCONTINUED | OUTPATIENT
Start: 2021-05-11 | End: 2021-05-11

## 2021-05-11 RX ORDER — DEXAMETHASONE SODIUM PHOSPHATE 10 MG/ML
INJECTION, SOLUTION INTRAMUSCULAR; INTRAVENOUS AS NEEDED
Status: DISCONTINUED | OUTPATIENT
Start: 2021-05-11 | End: 2021-05-11

## 2021-05-11 RX ORDER — ONDANSETRON 2 MG/ML
4 INJECTION INTRAMUSCULAR; INTRAVENOUS ONCE
Status: COMPLETED | OUTPATIENT
Start: 2021-05-11 | End: 2021-05-11

## 2021-05-11 RX ORDER — SODIUM CHLORIDE, SODIUM LACTATE, POTASSIUM CHLORIDE, CALCIUM CHLORIDE 600; 310; 30; 20 MG/100ML; MG/100ML; MG/100ML; MG/100ML
125 INJECTION, SOLUTION INTRAVENOUS CONTINUOUS
Status: DISCONTINUED | OUTPATIENT
Start: 2021-05-11 | End: 2021-05-11 | Stop reason: HOSPADM

## 2021-05-11 RX ORDER — SODIUM CHLORIDE, SODIUM LACTATE, POTASSIUM CHLORIDE, CALCIUM CHLORIDE 600; 310; 30; 20 MG/100ML; MG/100ML; MG/100ML; MG/100ML
100 INJECTION, SOLUTION INTRAVENOUS CONTINUOUS
Status: DISCONTINUED | OUTPATIENT
Start: 2021-05-11 | End: 2021-05-11 | Stop reason: HOSPADM

## 2021-05-11 RX ORDER — BUPIVACAINE HYDROCHLORIDE 2.5 MG/ML
INJECTION, SOLUTION EPIDURAL; INFILTRATION; INTRACAUDAL AS NEEDED
Status: DISCONTINUED | OUTPATIENT
Start: 2021-05-11 | End: 2021-05-11 | Stop reason: HOSPADM

## 2021-05-11 RX ORDER — FENTANYL CITRATE 50 UG/ML
INJECTION, SOLUTION INTRAMUSCULAR; INTRAVENOUS AS NEEDED
Status: DISCONTINUED | OUTPATIENT
Start: 2021-05-11 | End: 2021-05-11

## 2021-05-11 RX ORDER — HYDROMORPHONE HCL/PF 1 MG/ML
1 SYRINGE (ML) INJECTION ONCE
Status: COMPLETED | OUTPATIENT
Start: 2021-05-11 | End: 2021-05-11

## 2021-05-11 RX ORDER — FENTANYL CITRATE/PF 50 MCG/ML
25 SYRINGE (ML) INJECTION
Status: DISCONTINUED | OUTPATIENT
Start: 2021-05-11 | End: 2021-05-11 | Stop reason: HOSPADM

## 2021-05-11 RX ORDER — ONDANSETRON 2 MG/ML
4 INJECTION INTRAMUSCULAR; INTRAVENOUS ONCE AS NEEDED
Status: DISCONTINUED | OUTPATIENT
Start: 2021-05-11 | End: 2021-05-11 | Stop reason: HOSPADM

## 2021-05-11 RX ORDER — HEPARIN SODIUM 5000 [USP'U]/ML
5000 INJECTION, SOLUTION INTRAVENOUS; SUBCUTANEOUS EVERY 8 HOURS SCHEDULED
Status: DISCONTINUED | OUTPATIENT
Start: 2021-05-11 | End: 2021-05-11 | Stop reason: HOSPADM

## 2021-05-11 RX ORDER — OXYCODONE HYDROCHLORIDE 5 MG/1
5 TABLET ORAL EVERY 4 HOURS PRN
Status: DISCONTINUED | OUTPATIENT
Start: 2021-05-11 | End: 2021-05-11 | Stop reason: HOSPADM

## 2021-05-11 RX ORDER — MAGNESIUM HYDROXIDE 1200 MG/15ML
LIQUID ORAL AS NEEDED
Status: DISCONTINUED | OUTPATIENT
Start: 2021-05-11 | End: 2021-05-11 | Stop reason: HOSPADM

## 2021-05-11 RX ORDER — ROCURONIUM BROMIDE 10 MG/ML
INJECTION, SOLUTION INTRAVENOUS AS NEEDED
Status: DISCONTINUED | OUTPATIENT
Start: 2021-05-11 | End: 2021-05-11

## 2021-05-11 RX ORDER — LIDOCAINE HYDROCHLORIDE 10 MG/ML
INJECTION, SOLUTION EPIDURAL; INFILTRATION; INTRACAUDAL; PERINEURAL AS NEEDED
Status: DISCONTINUED | OUTPATIENT
Start: 2021-05-11 | End: 2021-05-11

## 2021-05-11 RX ADMIN — DEXAMETHASONE SODIUM PHOSPHATE 4 MG: 10 INJECTION, SOLUTION INTRAMUSCULAR; INTRAVENOUS at 16:05

## 2021-05-11 RX ADMIN — METRONIDAZOLE 500 MG: 500 INJECTION, SOLUTION INTRAVENOUS at 10:23

## 2021-05-11 RX ADMIN — FENTANYL CITRATE 50 MCG: 50 INJECTION, SOLUTION INTRAMUSCULAR; INTRAVENOUS at 16:11

## 2021-05-11 RX ADMIN — CEFTRIAXONE SODIUM 1000 MG: 10 INJECTION, POWDER, FOR SOLUTION INTRAVENOUS at 08:34

## 2021-05-11 RX ADMIN — FENTANYL CITRATE 50 MCG: 50 INJECTION, SOLUTION INTRAMUSCULAR; INTRAVENOUS at 17:16

## 2021-05-11 RX ADMIN — ROCURONIUM BROMIDE 40 MG: 10 SOLUTION INTRAVENOUS at 15:58

## 2021-05-11 RX ADMIN — IOHEXOL 100 ML: 350 INJECTION, SOLUTION INTRAVENOUS at 05:17

## 2021-05-11 RX ADMIN — FENTANYL CITRATE 25 MCG: 50 INJECTION INTRAMUSCULAR; INTRAVENOUS at 17:48

## 2021-05-11 RX ADMIN — GLYCOPYRROLATE 0.4 MG: 0.2 INJECTION, SOLUTION INTRAMUSCULAR; INTRAVENOUS at 17:06

## 2021-05-11 RX ADMIN — ONDANSETRON 4 MG: 2 INJECTION INTRAMUSCULAR; INTRAVENOUS at 16:25

## 2021-05-11 RX ADMIN — SODIUM CHLORIDE 1000 ML: 0.9 INJECTION, SOLUTION INTRAVENOUS at 04:41

## 2021-05-11 RX ADMIN — FAMOTIDINE 20 MG: 10 INJECTION, SOLUTION INTRAVENOUS at 06:01

## 2021-05-11 RX ADMIN — DIPHENHYDRAMINE HYDROCHLORIDE 25 MG: 50 INJECTION, SOLUTION INTRAMUSCULAR; INTRAVENOUS at 18:04

## 2021-05-11 RX ADMIN — HYDROMORPHONE HYDROCHLORIDE 1 MG: 1 INJECTION, SOLUTION INTRAMUSCULAR; INTRAVENOUS; SUBCUTANEOUS at 04:42

## 2021-05-11 RX ADMIN — PROPOFOL 200 MG: 10 INJECTION, EMULSION INTRAVENOUS at 15:58

## 2021-05-11 RX ADMIN — LIDOCAINE HYDROCHLORIDE 50 MG: 10 INJECTION, SOLUTION EPIDURAL; INFILTRATION; INTRACAUDAL at 15:58

## 2021-05-11 RX ADMIN — METOPROLOL TARTRATE 2.5 MG: 1 INJECTION, SOLUTION INTRAVENOUS at 16:30

## 2021-05-11 RX ADMIN — MIDAZOLAM HYDROCHLORIDE 2 MG: 1 INJECTION, SOLUTION INTRAMUSCULAR; INTRAVENOUS at 15:52

## 2021-05-11 RX ADMIN — EPHEDRINE SULFATE 10 MG: 50 INJECTION, SOLUTION INTRAVENOUS at 16:24

## 2021-05-11 RX ADMIN — FENTANYL CITRATE 50 MCG: 50 INJECTION, SOLUTION INTRAMUSCULAR; INTRAVENOUS at 16:16

## 2021-05-11 RX ADMIN — NEOSTIGMINE METHYLSULFATE 3 MG: 1 INJECTION INTRAVENOUS at 17:06

## 2021-05-11 RX ADMIN — METRONIDAZOLE 500 MG: 500 INJECTION, SOLUTION INTRAVENOUS at 16:02

## 2021-05-11 RX ADMIN — ONDANSETRON 4 MG: 2 INJECTION INTRAMUSCULAR; INTRAVENOUS at 04:42

## 2021-05-11 RX ADMIN — FENTANYL CITRATE 50 MCG: 50 INJECTION, SOLUTION INTRAMUSCULAR; INTRAVENOUS at 17:10

## 2021-05-11 RX ADMIN — CEFAZOLIN SODIUM 2000 MG: 2 SOLUTION INTRAVENOUS at 15:52

## 2021-05-11 RX ADMIN — SODIUM CHLORIDE, SODIUM LACTATE, POTASSIUM CHLORIDE, AND CALCIUM CHLORIDE: .6; .31; .03; .02 INJECTION, SOLUTION INTRAVENOUS at 15:50

## 2021-05-11 RX ADMIN — ALUMINA, MAGNESIA, AND SIMETHICONE ORAL SUSPENSION REGULAR STRENGTH 30 ML: 1200; 1200; 120 SUSPENSION ORAL at 06:01

## 2021-05-11 NOTE — ED CARE HANDOFF
Emergency Department Sign Out Note        Sign out and transfer of care from Debbie PÉREZ  See Separate Emergency Department note  The patient, Akilah Thomson, was evaluated by the previous provider for abd pain    Workup Completed: Follow up ultrasound, repeat evaluations and surgery consultation    ED Course / Workup Pending (followup): Patient found to have acute cholecystitis  , repeat evaluations the patient multiple times, abdomen tender in the right upper quadrant, discussed case with surgery, will but will take patient to OR for emergent laparoscopic cholecystectomy                                     CriticalCare Time  Performed by: Ethan Mary DO  Authorized by: Ethan Mary DO     Critical care provider statement:     Critical care time (minutes):  35    Critical care time was exclusive of:  Separately billable procedures and treating other patients    Critical care was necessary to treat or prevent imminent or life-threatening deterioration of the following conditions: Coordination of care with surgery for emergent cholecystectomy, patient taken directly from ed to or  Critical care was time spent personally by me on the following activities:  Obtaining history from patient or surrogate, development of treatment plan with patient or surrogate, discussions with consultants, evaluation of patient's response to treatment, examination of patient, ordering and performing treatments and interventions, ordering and review of laboratory studies, ordering and review of radiographic studies, re-evaluation of patient's condition and review of old charts      MDM  Number of Diagnoses or Management Options  Acute cholecystitis: new and requires workup  Nausea & vomiting: new and requires workup  Diagnosis management comments: CT scan showing acute cholecystitis, discussed the patient,  and surgery  , decision made to perform urgent cholecystectomy         Amount and/or Complexity of Data Reviewed  Clinical lab tests: ordered and reviewed  Tests in the radiology section of CPT®: ordered and reviewed  Review and summarize past medical records: yes  Discuss the patient with other providers: yes  Independent visualization of images, tracings, or specimens: yes        Disposition  Final diagnoses:   Nausea & vomiting   Acute cholecystitis     Time reflects when diagnosis was documented in both MDM as applicable and the Disposition within this note     Time User Action Codes Description Comment    5/11/2021  7:58 AM Tootie Jeronimo Add [R11 2] Nausea & vomiting     5/11/2021  9:25 AM Brady Small Add [K80 00] Acute calculous cholecystitis     5/11/2021  4:19 PM Roselia Sebastian ANDREW Add [K81 0] Acute cholecystitis     5/11/2021  4:19 PM Williams Geronimos Modify [R11 2] Nausea & vomiting     5/11/2021  4:19 PM Sophronia Haven Modify [K81 0] Acute cholecystitis       ED Disposition     ED Disposition Condition Date/Time Comment    Send to OR  Tue May 11, 2021  4:19 PM       Follow-up Information    None       Current Discharge Medication List      CONTINUE these medications which have NOT CHANGED    Details   escitalopram (LEXAPRO) 10 mg tablet Take 10 mg by mouth daily      metoprolol succinate (TOPROL-XL) 50 mg 24 hr tablet Take 50 mg by mouth daily      Multiple Vitamins-Minerals (VITAMIN D3 COMPLETE PO) Take 50,000 Units by mouth      spironolactone (ALDACTONE) 50 mg tablet TAKE 1 TABLET BY MOUTH DAILY FOR 2 WEEKS  THEN INCREASE TO 1 TABLET TWICE A DAY THEREAFTER    Refills: 1      amLODIPine (NORVASC) 5 mg tablet TK 1 T PO QD  Refills: 6      budesonide-formoterol (Symbicort) 80-4 5 MCG/ACT inhaler Inhale 2 puffs 2 (two) times a day      Cholecalciferol (VITAMIN D3) 49100 units CAPS Take by mouth      desloratadine (CLARINEX) 5 MG tablet Take 1 tablet (5 mg total) by mouth daily  Qty: 90 tablet, Refills: 3    Associated Diagnoses: Non-seasonal allergic rhinitis, unspecified trigger      levalbuterol [facility]) 45 mcg/act inhaler Inhale 1-2 puffs      Sulfacetamide Sodium, Acne, 10 % LOTN       sulfamethoxazole-trimethoprim (BACTRIM DS) 800-160 mg per tablet TK 1 T PO Q 12 H      tiotropium (SPIRIVA RESPIMAT) 1 25 MCG/ACT AERS inhaler Inhale 2 puffs daily  Qty: 1 Inhaler, Refills: 0    Associated Diagnoses: Mild persistent asthma, unspecified whether complicated           No discharge procedures on file         ED Provider  Electronically Signed by     Luis Ivory DO  05/11/21 2628

## 2021-05-11 NOTE — ANESTHESIA POSTPROCEDURE EVALUATION
Post-Op Assessment Note    CV Status:  Stable    Pain management: adequate     Mental Status:  Alert and awake   Hydration Status:  Euvolemic   PONV Controlled:  Controlled   Airway Patency:  Patent      Post Op Vitals Reviewed: Yes      Staff: Anesthesiologist, CRNA         No complications documented      /69 (05/11/21 1720)    Temp (!) 97 1 °F (36 2 °C) (05/11/21 1720)    Pulse 69 (05/11/21 1720)   Resp 16 (05/11/21 1720)    SpO2 (P) 93 % (05/11/21 1720)

## 2021-05-11 NOTE — OP NOTE
OPERATIVE REPORT  PATIENT NAME: Alexandra Jiang    :  1970  MRN: [id number]  Pt Location: AN OR ROOM 03    SURGERY DATE: 2021    Surgeon(s) and Role:     * Reuben Rodriguez DO - Primary     * Mary Garcia MD - Assisting    Preop Diagnosis:  Acute calculous cholecystitis [K80 00]    Post-Op Diagnosis Codes:     * Acute calculous cholecystitis [K80 00]    Procedure(s) (LRB):  CHOLECYSTECTOMY LAPAROSCOPIC: POSSIBLE OPEN (N/A)    Specimen(s):  ID Type Source Tests Collected by Time Destination   1 : CC family/referring MD Tissue Gallbladder TISSUE EXAM Austin FlorianDO 2021 1616        Estimated Blood Loss:   Minimal    Drains:  * No LDAs found *    Anesthesia Type:   General/local    Operative Indications:  Acute calculous cholecystitis [K80 00]      Operative Findings:  Changes consistent with acute calculous cholecystitis  ASA 2  Wound class 2  Height 69 in weight 96 kg/212 lb  BMI 31    Complications:   None    Procedure and Technique:  Patient was brought the operative suite and identified by visualization, conversation, by armband  Sequential compression pumps were placed  She was given Ancef perioperatively  Once under anesthesia abdomen is then prepped and draped in a sterile fashion  Time-out was performed was assured that the prep was dry  Local was instilled at the supraumbilical fold  Small vertical skin incision was made in the subcutaneous tissues were divided bluntly down to the fascia fascia lifted up and divided the midline  Poked through the underlying peritoneum gaining access into the abdominal cavity  11 mm trocar was placed under direct visualization  CO2 was attached creating pneumoperitoneum did have some adhesions just above the umbilicus  Were able to look around these and placed three 5 mm bladeless trocars    I then looked back upon the umbilicus I was able to do some lysis of adhesions of the omentum up to the anterior abdominal wall provide room for our gallbladder procedure  Gallbladder was lifted cephalad  She did have some omentum to the lower half of the gallbladder  This was stripped away using blunt dissection use of the Harmonic scalpel  Careful dissection was carried out with lateral retraction of the neck  In this fashion both cystic duct and cystic artery structures were exposed  These were divided to Ligaclips  Gallbladder was then taken off the liver using variable speed Harmonic scalpel  Once the gallbladder was off the liver there was excellent hemostasis  Gauze was placed into an Endo-Catch bag  Pneumoperitoneum was evacuated  Gallbladder was then brought to the umbilical port site  Other trocars removed  Fascia at the umbilical site was closed using 0 Vicryl in a figure-of-eight fashion  Local was instilled  Four Monocryl was used to close skin incisions in a subcuticular fashion  Wounds washed and dried  Sterile skin glue was applied  She was awake in the operating returned to the recovery area in stable condition having tolerated the procedure well     I was present for the entire procedure    Patient Disposition:  PACU     SIGNATURE: Elba Kemp DO  DATE: May 11, 2021  TIME: 4:56 PM

## 2021-05-11 NOTE — H&P
H&P Exam - General Surgery   Louie Martini 46 y o  female MRN: [id number]  Unit/Bed#: ED 07 Encounter: [id number]    Assessment:  50-yr old female with acute cholecystitis  2-hr H/o upper abdominal pain associated with nausea and vomiting (x4) prior to ED presentation  Mild RUQ/epigastric tenderness on exam   Hemodynamically stable  White count: 7  Total bilirubin: 0 3; other LFT parameters normal   CT abdomen & pelvis showed gallstones, gall bladder wall thickening with pericholecystic fluid/edema  Plan:  - OR for laparoscopic cholecystectomy today  - informed consent for surgery  - keep NPO/ice chips  - IVF crystalloids at maintenance rate  - IV ancef/flagyl   - DVT/PE prophylaxis  History of Present Illness   HPI:  Louie Martini is a 46 y o  female who presents with upper abdominal pain of sudden onset which woke her from her sleep at around 2 am  Pain severity at its peak was put at 10/10; it was constant  H/o of associated nausea and 4 episodes of non-bilious emesis  She denies having similar symptom constellation in the past  No hx of jaundice, pruritus, passage of melanotic stools or bleeding per rectum  Medical history is significant for essential HTN controlled on lopressor, amlodipine and aldactone  She has had an appendectomy and 5 previous pelvic surgeries; indications included symptomatic ovarian cysts and uterine fibroids  She had a total hysterectomy done about 20 years ago  Occasionally consumes alcohol and quit smoking tobacco several years ago      Review of Systems    Historical Information   Past Medical History:   Diagnosis Date    Acne     Allergic rhinitis     Chronic urticaria     Hypertension     Nasal congestion     Vitamin D deficiency     Wears glasses      Past Surgical History:   Procedure Laterality Date    APPENDECTOMY      COLONOSCOPY      HYSTERECTOMY      partial = age 27    LAPAROSCOPIC OVARIAN CYSTECTOMY      x 3    MAMMO (HISTORICAL)      SHOULDER SURGERY Right 2017    TOTAL ABDOMINAL HYSTERECTOMY W/ BILATERAL SALPINGOOPHORECTOMY      age 37     Social History   Social History     Substance and Sexual Activity   Alcohol Use Yes    Comment: social     Social History     Substance and Sexual Activity   Drug Use No     Social History     Tobacco Use   Smoking Status Former Smoker    Packs/day: 0 50    Types: Cigarettes    Quit date: 2019    Years since quittin 7   Smokeless Tobacco Never Used   Tobacco Comment    1 PPD per [place]     E-Cigarette/Vaping    E-Cigarette Use Never User      E-Cigarette/Vaping Substances    Nicotine No     THC No     CBD No     Flavoring No     Other No     Unknown No      Family History: non-contributory    Meds/Allergies   all medications and allergies reviewed  Allergies   Allergen Reactions    Morphine Rash     vomitting  vomitting      Other      Environmental, Nuts       Objective   First Vitals:   Blood Pressure: (!) 186/103 (21)  Pulse: 80 (21)  Temperature: 97 6 °F (36 4 °C) (215)  Temp Source: Oral (21)  Respirations: 20 (21)  Weight - Scale: 96 1 kg (211 lb 13 8 oz) (21)  SpO2: 100 % (21)    Current Vitals:   Blood Pressure: 162/77 (21 0448)  Pulse: 79 (21)  Temperature: 97 6 °F (36 4 °C) (215)  Temp Source: Oral (21)  Respirations: 18 (218)  Weight - Scale: 96 1 kg (211 lb 13 8 oz) (21)  SpO2: 100 % (21 0448)    No intake or output data in the 24 hours ending 21 0914    Invasive Devices     Peripheral Intravenous Line            Peripheral IV 21 Right Antecubital less than 1 day                Physical Exam  Vitals signs reviewed  Constitutional:       General: She is not in acute distress  Appearance: She is well-developed  She is not toxic-appearing or diaphoretic  HENT:      Head: Normocephalic and atraumatic        Mouth/Throat: Mouth: Mucous membranes are moist       Pharynx: Oropharynx is clear  Cardiovascular:      Rate and Rhythm: Normal rate and regular rhythm  Pulmonary:      Effort: Pulmonary effort is normal  No respiratory distress  Abdominal:      General: Abdomen is protuberant  A surgical scar is present  There is no distension  Palpations: Abdomen is soft  Tenderness: There is abdominal tenderness in the right upper quadrant and epigastric area  Negative signs include Braswell's sign  Hernia: No hernia is present  Comments: Pfannenstiel and midline infra-umbilical scars noted  Skin:     General: Skin is warm  Capillary Refill: Capillary refill takes less than 2 seconds  Neurological:      General: No focal deficit present  Mental Status: She is alert and oriented to person, place, and time  Psychiatric:         Mood and Affect: Mood normal          Lab Results:   I have personally reviewed pertinent lab results  , CBC:   Lab Results   Component Value Date    WBC 7 42 05/11/2021    HGB 12 9 05/11/2021    HCT 40 1 05/11/2021    MCV 86 05/11/2021     05/11/2021    MCH 27 8 05/11/2021    MCHC 32 2 05/11/2021    RDW 12 7 05/11/2021    MPV 10 7 05/11/2021    NRBC 0 05/11/2021   , CMP:   Lab Results   Component Value Date    SODIUM 139 05/11/2021    K 4 9 05/11/2021     05/11/2021    CO2 28 05/11/2021    BUN 19 05/11/2021    CREATININE 0 85 05/11/2021    CALCIUM 9 0 05/11/2021    AST 47 (H) 05/11/2021    ALT 36 05/11/2021    ALKPHOS 81 05/11/2021    EGFR 90 05/11/2021     Imaging: I have personally reviewed pertinent reports  EKG, Pathology, and Other Studies: I have personally reviewed pertinent reports  Code Status: Level 1 - Full Code  Advance Directive and Living Will:      Power of :    POLST:      Counseling / Coordination of Care  Total floor / unit time spent today 30 minutes    Greater than 50% of total time was spent with the patient and / or family counseling and / or coordination of care    A description of the counseling / coordination of care: na

## 2021-05-11 NOTE — ED PROVIDER NOTES
History  Chief Complaint   Patient presents with    Abdominal Pain     Patient c/o upper abd pain, N/V that began approx at 0200 today  Denies SOB and chest pain  Patient is a 57-year-old female with history of hypertension, appendectomy, and total hysterectomy that presents emergency department with dull persistent upper abdominal pain for 2 hours  Patient has associated symptomatology nausea vomiting symptoms beginning the current ED presentation of upper abdominal pain  Patient denies history of abdominal pain to present  Patient denies questionable dietary item intake  Patient verbalized the last meal she had consumed was cooked shrimp  Patient denies palliative factors with provocative factors of pressure to epigastric area  Patient is not effective treatment  Patient denies fevers, chills, diarrhea, constipation urinary symptoms  Patient denies recent fall or recent trauma  Patient denies sick contacts or recent travel  Patient denies chest pain and shortness of breath  History provided by:  Patient   used: No    Abdominal Pain  Pain location:  Epigastric  Pain quality: dull    Pain radiates to:  Does not radiate  Pain severity:  Mild  Onset quality:  Sudden  Duration:  2 hours  Timing:  Constant  Progression:  Worsening  Chronicity:  New  Relieved by:  Nothing  Worsened by:  Palpation  Ineffective treatments:  None tried  Associated symptoms: nausea and vomiting    Associated symptoms: no chest pain, no chills, no constipation, no cough, no diarrhea, no dysuria, no fever, no shortness of breath and no sore throat        Prior to Admission Medications   Prescriptions Last Dose Informant Patient Reported? Taking?    Cholecalciferol (VITAMIN D3) 39004 units CAPS Not Taking at Unknown time  Yes No   Sig: Take by mouth   Multiple Vitamins-Minerals (VITAMIN D3 COMPLETE PO) 5/10/2021 at Unknown time  Yes Yes   Sig: Take 50,000 Units by mouth   Sulfacetamide Sodium, Acne, 10 % LOTN Not Taking at Unknown time  Yes No   amLODIPine (NORVASC) 5 mg tablet Not Taking at Unknown time  Yes No   Sig: TK 1 T PO QD   budesonide-formoterol (Symbicort) 80-4 5 MCG/ACT inhaler Not Taking at Unknown time  Yes No   Sig: Inhale 2 puffs 2 (two) times a day   desloratadine (CLARINEX) 5 MG tablet Not Taking at Unknown time  No No   Sig: Take 1 tablet (5 mg total) by mouth daily   Patient not taking: Reported on 5/11/2021   escitalopram (LEXAPRO) 10 mg tablet 5/10/2021 at Unknown time  Yes Yes   Sig: Take 10 mg by mouth daily   levalbuterol (XOPENEX HFA) 45 mcg/act inhaler Not Taking at Unknown time  Yes No   Sig: Inhale 1-2 puffs   metoprolol succinate (TOPROL-XL) 50 mg 24 hr tablet 5/10/2021 at Unknown time  Yes Yes   Sig: Take 50 mg by mouth daily   spironolactone (ALDACTONE) 50 mg tablet 5/10/2021 at Unknown time  Yes Yes   Sig: TAKE 1 TABLET BY MOUTH DAILY FOR 2 WEEKS   THEN INCREASE TO 1 TABLET TWICE A DAY THEREAFTER    sulfamethoxazole-trimethoprim (BACTRIM DS) 800-160 mg per tablet Not Taking at Unknown time  Yes No   Sig: TK 1 T PO Q 12 H   tiotropium (SPIRIVA RESPIMAT) 1 25 MCG/ACT AERS inhaler   No No   Sig: Inhale 2 puffs daily      Facility-Administered Medications: None       Past Medical History:   Diagnosis Date    Acne     Allergic rhinitis     Chronic urticaria     Hypertension     Nasal congestion     Vitamin D deficiency     Wears glasses        Past Surgical History:   Procedure Laterality Date    APPENDECTOMY      COLONOSCOPY      HYSTERECTOMY      partial = age 27   Fidencio Harrison LAPAROSCOPIC OVARIAN CYSTECTOMY      x 3    MAMMO (HISTORICAL)      SHOULDER SURGERY Right 05/24/2017    TOTAL ABDOMINAL HYSTERECTOMY W/ BILATERAL SALPINGOOPHORECTOMY      age 37       Family History   Problem Relation Age of Onset    Hypertension Mother     Hypertension Father     Breast cancer Maternal Aunt     Ovarian cancer Maternal Aunt     Diabetes Maternal Aunt     Colon cancer Maternal Uncle     Lung cancer Maternal Uncle     No Known Problems Sister     Hypertension Sister     Sickle cell anemia Sister     No Known Problems Brother      I have reviewed and agree with the history as documented  E-Cigarette/Vaping    E-Cigarette Use Never User      E-Cigarette/Vaping Substances    Nicotine No     THC No     CBD No     Flavoring No     Other No     Unknown No      Social History     Tobacco Use    Smoking status: Former Smoker     Packs/day: 0 50     Types: Cigarettes     Quit date: 2019     Years since quittin 7    Smokeless tobacco: Never Used    Tobacco comment: 1 PPD per Shonna Napier   Substance Use Topics    Alcohol use: Yes     Comment: social    Drug use: No       Review of Systems   Constitutional: Negative for activity change, appetite change, chills and fever  HENT: Negative for congestion, postnasal drip, rhinorrhea, sinus pressure, sinus pain, sore throat and tinnitus  Eyes: Negative for photophobia and visual disturbance  Respiratory: Negative for cough, chest tightness and shortness of breath  Cardiovascular: Negative for chest pain and palpitations  Gastrointestinal: Positive for abdominal pain, nausea and vomiting  Negative for constipation and diarrhea  Genitourinary: Negative for difficulty urinating, dysuria, flank pain, frequency and urgency  Musculoskeletal: Negative for back pain, gait problem, neck pain and neck stiffness  Skin: Negative for pallor and rash  Allergic/Immunologic: Negative for environmental allergies and food allergies  Neurological: Negative for dizziness, weakness, numbness and headaches  Psychiatric/Behavioral: Negative for confusion  All other systems reviewed and are negative  Physical Exam  Physical Exam  Vitals signs and nursing note reviewed  Constitutional:       General: She is awake  Appearance: Normal appearance  She is well-developed  She is not ill-appearing, toxic-appearing or diaphoretic  Comments: /77 (BP Location: Left arm)   Pulse 79   Temp 97 6 °F (36 4 °C) (Oral)   Resp 18   Wt 96 1 kg (211 lb 13 8 oz)   SpO2 100%   BMI 31 29 kg/m²      HENT:      Head: Normocephalic and atraumatic  Right Ear: Hearing and external ear normal  No decreased hearing noted  No drainage, swelling or tenderness  No mastoid tenderness  Left Ear: Hearing and external ear normal  No decreased hearing noted  No drainage, swelling or tenderness  No mastoid tenderness  Nose: Nose normal       Mouth/Throat:      Lips: Pink  Mouth: Mucous membranes are moist       Pharynx: Oropharynx is clear  Uvula midline  Eyes:      General: Lids are normal  Vision grossly intact  Right eye: No discharge  Left eye: No discharge  Extraocular Movements: Extraocular movements intact  Conjunctiva/sclera: Conjunctivae normal       Pupils: Pupils are equal, round, and reactive to light  Neck:      Musculoskeletal: Full passive range of motion without pain, normal range of motion and neck supple  Normal range of motion  No neck rigidity, spinous process tenderness or muscular tenderness  Vascular: No JVD  Trachea: Trachea and phonation normal  No tracheal tenderness or tracheal deviation  Cardiovascular:      Rate and Rhythm: Normal rate and regular rhythm  Pulses: Normal pulses  Radial pulses are 2+ on the right side and 2+ on the left side  Posterior tibial pulses are 2+ on the right side and 2+ on the left side  Heart sounds: Normal heart sounds  Pulmonary:      Effort: Pulmonary effort is normal       Breath sounds: Normal breath sounds  No stridor  No decreased breath sounds, wheezing, rhonchi or rales  Chest:      Chest wall: No tenderness  Abdominal:      General: Abdomen is flat  Bowel sounds are normal  There is no distension  Palpations: Abdomen is soft  Abdomen is not rigid  Tenderness:  There is abdominal tenderness in the epigastric area  There is no right CVA tenderness, left CVA tenderness, guarding or rebound  Positive signs include Braswell's sign  Musculoskeletal: Normal range of motion  Lymphadenopathy:      Head:      Right side of head: No submental, submandibular, tonsillar, preauricular, posterior auricular or occipital adenopathy  Left side of head: No submental, submandibular, tonsillar, preauricular, posterior auricular or occipital adenopathy  Cervical: No cervical adenopathy  Right cervical: No superficial, deep or posterior cervical adenopathy  Left cervical: No superficial, deep or posterior cervical adenopathy  Skin:     General: Skin is warm  Capillary Refill: Capillary refill takes less than 2 seconds  Neurological:      General: No focal deficit present  Mental Status: She is alert and oriented to person, place, and time  GCS: GCS eye subscore is 4  GCS verbal subscore is 5  GCS motor subscore is 6  Sensory: No sensory deficit  Deep Tendon Reflexes: Reflexes are normal and symmetric  Reflex Scores:       Patellar reflexes are 2+ on the right side and 2+ on the left side  Psychiatric:         Mood and Affect: Mood normal          Speech: Speech normal          Behavior: Behavior normal  Behavior is cooperative  Thought Content:  Thought content normal          Judgment: Judgment normal          Vital Signs  ED Triage Vitals   Temperature Pulse Respirations Blood Pressure SpO2   05/11/21 0415 05/11/21 0413 05/11/21 0413 05/11/21 0413 05/11/21 0413   97 6 °F (36 4 °C) 80 20 (!) 186/103 100 %      Temp Source Heart Rate Source Patient Position - Orthostatic VS BP Location FiO2 (%)   05/11/21 0415 05/11/21 0413 05/11/21 0413 05/11/21 0413 --   Oral Monitor Lying Right arm       Pain Score       05/11/21 0413       Worst Possible Pain           Vitals:    05/11/21 0413 05/11/21 0448   BP: (!) 186/103 162/77   Pulse: 80 79   Patient Position - Orthostatic VS: Lying Lying         Visual Acuity      ED Medications  Medications   sodium chloride 0 9 % bolus 1,000 mL (1,000 mL Intravenous New Bag 5/11/21 0441)   ondansetron (ZOFRAN) injection 4 mg (4 mg Intravenous Given 5/11/21 0442)   HYDROmorphone (DILAUDID) injection 1 mg (1 mg Intravenous Given 5/11/21 0442)   iohexol (OMNIPAQUE) 350 MG/ML injection (SINGLE-DOSE) 100 mL (100 mL Intravenous Given 5/11/21 0517)   famotidine (PEPCID) injection 20 mg (20 mg Intravenous Given 5/11/21 0601)   aluminum-magnesium hydroxide-simethicone (MYLANTA) oral suspension 30 mL (30 mL Oral Given 5/11/21 0601)       Diagnostic Studies  Results Reviewed     Procedure Component Value Units Date/Time    Urine Microscopic [[id number]]  (Normal) Collected: 05/11/21 0617    Lab Status: Final result Specimen: Urine, Clean Catch Updated: 05/11/21 0650     RBC, UA None Seen /hpf      WBC, UA 0-1 /hpf      Epithelial Cells Occasional /hpf      Bacteria, UA None Seen /hpf     UA w Reflex to Microscopic w Reflex to Culture [[id number]]  (Abnormal) Collected: 05/11/21 0617    Lab Status: Final result Specimen: Urine, Clean Catch Updated: 05/11/21 0624     Color, UA Yellow     Clarity, UA Clear     Specific Gravity, UA <=1 005     pH, UA 6 0     Leukocytes, UA Negative     Nitrite, UA Negative     Protein, UA Negative mg/dl      Glucose, UA Negative mg/dl      Ketones, UA Negative mg/dl      Urobilinogen, UA 0 2 E U /dl      Bilirubin, UA Negative     Blood, UA Trace-Intact    Troponin I [[id number]]  (Normal) Collected: 05/11/21 0431    Lab Status: Final result Specimen: Blood from Arm, Right Updated: 05/11/21 0504     Troponin I <0 02 ng/mL     Comprehensive metabolic panel [[id number]]  (Abnormal) Collected: 05/11/21 0431    Lab Status: Final result Specimen: Blood from Arm, Right Updated: 05/11/21 0459     Sodium 139 mmol/L      Potassium 4 9 mmol/L      Chloride 104 mmol/L      CO2 28 mmol/L      ANION GAP 7 mmol/L      BUN 19 mg/dL Creatinine 0 85 mg/dL      Glucose 144 mg/dL      Calcium 9 0 mg/dL      AST 47 U/L      ALT 36 U/L      Alkaline Phosphatase 81 U/L      Total Protein 8 0 g/dL      Albumin 3 7 g/dL      Total Bilirubin 0 30 mg/dL      eGFR 90 ml/min/1 73sq m     Narrative:      [place] guidelines for Chronic Kidney Disease (CKD):     Stage 1 with normal or high GFR (GFR > 90 mL/min/1 73 square meters)    Stage 2 Mild CKD (GFR = 60-89 mL/min/1 73 square meters)    Stage 3A Moderate CKD (GFR = 45-59 mL/min/1 73 square meters)    Stage 3B Moderate CKD (GFR = 30-44 mL/min/1 73 square meters)    Stage 4 Severe CKD (GFR = 15-29 mL/min/1 73 square meters)    Stage 5 End Stage CKD (GFR <15 mL/min/1 73 square meters)  Note: GFR calculation is accurate only with a steady state creatinine    Lactic acid [[id number]]  (Normal) Collected: 05/11/21 0431    Lab Status: Final result Specimen: Blood from Arm, Right Updated: 05/11/21 0459     LACTIC ACID 1 7 mmol/L     Narrative:      Result may be elevated if tourniquet was used during collection      Lipase [[id number]]  (Abnormal) Collected: 05/11/21 0431    Lab Status: Final result Specimen: Blood from Arm, Right Updated: 05/11/21 0459     Lipase 57 u/L     Magnesium [[id number]]  (Normal) Collected: 05/11/21 0431    Lab Status: Final result Specimen: Blood from Arm, Right Updated: 05/11/21 0459     Magnesium 1 9 mg/dL     Protime-INR [[id number]]  (Normal) Collected: 05/11/21 0431    Lab Status: Final result Specimen: Blood from Arm, Right Updated: 05/11/21 0448     Protime 12 8 seconds      INR 0 95    APTT [[id number]]  (Normal) Collected: 05/11/21 0431    Lab Status: Final result Specimen: Blood from Arm, Right Updated: 05/11/21 0448     PTT 27 seconds     CBC and differential [[id number]] Collected: 05/11/21 0431    Lab Status: Final result Specimen: Blood from Arm, Right Updated: 05/11/21 0440     WBC 7 42 Thousand/uL      RBC 4 64 Million/uL      Hemoglobin 12 9 g/dL      Hematocrit 40 1 %      MCV 86 fL      MCH 27 8 pg      MCHC 32 2 g/dL      RDW 12 7 %      MPV 10 7 fL      Platelets 261 Thousands/uL      nRBC 0 /100 WBCs      Neutrophils Relative 62 %      Immat GRANS % 1 %      Lymphocytes Relative 28 %      Monocytes Relative 7 %      Eosinophils Relative 1 %      Basophils Relative 1 %      Neutrophils Absolute 4 60 Thousands/µL      Immature Grans Absolute 0 05 Thousand/uL      Lymphocytes Absolute 2 08 Thousands/µL      Monocytes Absolute 0 55 Thousand/µL      Eosinophils Absolute 0 10 Thousand/µL      Basophils Absolute 0 04 Thousands/µL                  CT abdomen pelvis with contrast   ED Interpretation by Elizabeth Knight PA-C (05/11 0650)   Papito Willis DO  [phone] 5/11/2021     Narrative & Impression    CT ABDOMEN AND PELVIS WITH IV CONTRAST     INDICATION:   Nausea/vomiting  Epigastric pain Epigastric pain; nausea vomiting      COMPARISON:  CTA chest, abdomen, and pelvis dated 11/2/2018     TECHNIQUE:  CT examination of the abdomen and pelvis was performed  Axial, sagittal, and coronal 2D reformatted images were created from the source data and submitted for interpretation      Radiation dose length product (DLP) for this visit:  579 mGy-cm     This examination, like all CT scans performed in the [facility], was performed utilizing techniques to minimize radiation dose exposure, including the use of iterative   reconstruction and automated exposure control      IV Contrast:  100 mL of iohexol (OMNIPAQUE)  Enteric Contrast:  Enteric contrast was not administered      FINDINGS:     ABDOMEN     LOWER CHEST:  No clinically significant abnormality identified in the visualized lower chest      LIVER   Kathy Wilson TREE:  Unremarkable      GALLBLADDER:  There are gallstone(s) within the gallbladder with gallbladder wall thickening, suspicious for cholecystitis in the appropriate clinical setting     SPLEEN:  Unremarkable      PANCREAS: Unremarkable      ADRENAL GLANDS:  Unremarkable      KIDNEYS/URETERS:  Several tiny low-density lesions scattered in the right kidney, too small to definitively characterize but of unlikely clinical significance  Bilateral kidneys appear grossly unremarkable; no hydronephrosis      STOMACH AND BOWEL:  Scattered colonic diverticulosis  No discrete evidence of acute diverticulitis  No evidence of bowel obstruction      APPENDIX:  No findings to suggest appendicitis      ABDOMINOPELVIC CAVITY:  No ascites  No pneumoperitoneum  No lymphadenopathy      VESSELS:  Mild atherosclerosis; no aortic aneurysm     PELVIS     REPRODUCTIVE ORGANS:  Unremarkable for patient's age      URINARY BLADDER:  Unremarkable      ABDOMINAL WALL/INGUINAL REGIONS:  Unremarkabl   e      OSSEOUS STRUCTURES:  No acute fracture or destructive osseous lesion         IMPRESSION:     Cholelithiasis with associated gallbladder wall thickening, suspicious for cholecystitis in the appropriate clinical setting  Correlation with the patient's symptoms and laboratory values recommended      Colonic diverticulosis without evidence of acute diverticulitis      Other findings as above            Workstation performed: [id number]             Final Result by Sidra Thorne DO (05/11 1403)      Cholelithiasis with associated gallbladder wall thickening, suspicious for cholecystitis in the appropriate clinical setting  Correlation with the patient's symptoms and laboratory values recommended  Colonic diverticulosis without evidence of acute diverticulitis  Other findings as above  Workstation performed: [id number]         XR chest 1 view portable   ED Interpretation by [place] PA-C (05/11 9850)   No acute cardiopulmonary disease on initial read; direct visualization on portable chest x-ray machine by myself        US gallbladder    (Results Pending)              Procedures  ECG 12 Lead Documentation Only    Date/Time: 5/11/2021 4: 40 AM  Performed by: Geovanna Howe PA-C  Authorized by: Geovanna Howe PA-C     Indications / Diagnosis:  Epigastric pain and vomiting  ECG reviewed by me, the ED Provider: yes    Patient location:  ED  Previous ECG:     Previous ECG:  Compared to current    Comparison ECG info: When compared with ECG of April 5, 2019, no significant change was found    Similarity:  No change    Comparison to cardiac monitor: Yes    Interpretation:     Interpretation: normal    Rate:     ECG rate:  69    ECG rate assessment: normal    Rhythm:     Rhythm: sinus rhythm    Ectopy:     Ectopy: none    QRS:     QRS axis:  Normal    QRS intervals:  Normal  Conduction:     Conduction: normal    ST segments:     ST segments:  Normal  T waves:     T waves: normal               ED Course  ED Course as of May 11 0800   Tue May 11, 2021   2142 Patient has total hysterectomy      0509 TOTAL BILIRUBIN: 0 30   0509 AST(!): 47   0509 ALT: 36   0509 Alkaline Phosphatase: 81   0554 Patient verbalizes no pain at this time at time of reassessment        0710 Patient verbal understanding all clinical laboratory imaging findings at this time; patient was apprised that she is to have ultrasound of gallbladder      0719 Ultrasound is being performed by ultrasound technician at this time      [place] 96 text to Dr Danial William, General Surgical resident      8162 General surgery consult placed                                              MDM  Number of Diagnoses or Management Options  Nausea & vomiting:      Amount and/or Complexity of Data Reviewed  Clinical lab tests: ordered and reviewed  Tests in the radiology section of CPT®: ordered and reviewed  Review and summarize past medical records: yes    Risk of Complications, Morbidity, and/or Mortality  Presenting problems: moderate  Diagnostic procedures: moderate    Patient Progress  Patient progress: stable       Patient is a 64-year-old female with history of hypertension, appendectomy, and total hysterectomy that presents emergency department with dull persistent upper abdominal pain for 2 hours  Patient has associated symptomatology nausea vomiting symptoms beginning the current ED presentation of upper abdominal pain  Patient hemodynamically stable and afebrile  CT abdomen pelvis with contrast-impression-Cholelithiasis with associated gallbladder wall thickening, suspicious for cholecystitis in the appropriate clinical setting  Correlation with the patient's symptoms and laboratory values recommended      Colonic diverticulosis without evidence of acute diverticulitis      Other findings as above "  Ultrasound gallbladder in process  Slightly elevated AST; normal alk-phos, normal T bilirubin  No leukocytosis, no banding, no lactic acidosis  ECG with normal sinus rhythm negative troponin with cardiac etiology not likely  Unremarkable lipase with acute pancreatitis not likely  Delivered Dilaudid and Zofran with patient verbalized decrease in abdominal pain and nausea symptoms status post medication delivery  Tiger text to surgical resident with indication to evaluate patient at bedside after results of ultrasound  Sign out to Dr Toño Rodriguez  Patient verbal understanding all clinical laboratory imaging findings at this time          Disposition  Final diagnoses:   Nausea & vomiting     Time reflects when diagnosis was documented in both MDM as applicable and the Disposition within this note     Time User Action Codes Description Comment    5/11/2021  7:58 AM Yuniel German Add [R11 2] Nausea & vomiting       ED Disposition     None      Follow-up Information    None         Patient's Medications   Discharge Prescriptions    No medications on file     No discharge procedures on file      PDMP Review       Value Time User    PDMP Reviewed  Yes 5/11/2021  4:11 AM Zeke Marcus MD          ED Provider  Electronically Signed by           Elizabeth Knight PA-C  05/11/21 0800       Elizabeth Knight PA-C  05/11/21 0800

## 2021-05-11 NOTE — ANESTHESIA PREPROCEDURE EVALUATION
Procedure:  CHOLECYSTECTOMY LAPAROSCOPIC: POSSIBLE OPEN (N/A Abdomen)    Relevant Problems   ANESTHESIA (within normal limits)      CARDIO   (+) Dyspnea on exertion   (+) Hypertension, essential      GI/HEPATIC   (+) Gastroesophageal reflux disease (prn meds)      PULMONARY   (+) Dyspnea on exertion   (+) Mild intermittent asthma without complication (pt denies)      Other   (+) Chronic seasonal allergic rhinitis due to pollen    BMI 31    Physical Exam    Airway    Mallampati score: II  TM Distance: >3 FB  Neck ROM: full     Dental   No notable dental hx     Cardiovascular      Pulmonary      Other Findings       Lab Results   Component Value Date    WBC 7 42 05/11/2021    HGB 12 9 05/11/2021     05/11/2021     Lab Results   Component Value Date    SODIUM 139 05/11/2021    K 4 9 05/11/2021    BUN 19 05/11/2021    CREATININE 0 85 05/11/2021    EGFR 90 05/11/2021     Lab Results   Component Value Date    PTT 27 05/11/2021      Lab Results   Component Value Date    INR 0 95 05/11/2021     Anesthesia Plan  ASA Score- 2     Anesthesia Type- general with ASA Monitors  Additional Monitors:   Airway Plan: ETT  Plan Factors-Exercise tolerance (METS): >4 METS  Chart reviewed  Existing labs reviewed  Patient summary reviewed  Patient is not a current smoker  Induction- intravenous  Postoperative Plan-     Informed Consent- Anesthetic plan and risks discussed with patient  I personally reviewed this patient with the CRNA  Discussed and agreed on the Anesthesia Plan with the CRNA  Jaskaran Watson

## 2021-05-20 ENCOUNTER — TELEPHONE (OUTPATIENT)
Dept: GASTROENTEROLOGY | Facility: CLINIC | Age: 51
End: 2021-05-20

## 2021-05-20 ENCOUNTER — PREP FOR PROCEDURE (OUTPATIENT)
Dept: GASTROENTEROLOGY | Facility: CLINIC | Age: 51
End: 2021-05-20

## 2021-05-20 DIAGNOSIS — Z12.11 COLON CANCER SCREENING: Primary | ICD-10-CM

## 2021-05-20 DIAGNOSIS — D12.6 TUBULAR ADENOMA OF COLON: ICD-10-CM

## 2021-05-20 DIAGNOSIS — Z12.11 COLON CANCER SCREENING: ICD-10-CM

## 2021-05-20 DIAGNOSIS — Z86.010 HISTORY OF COLON POLYPS: Primary | ICD-10-CM

## 2021-05-20 RX ORDER — SODIUM, POTASSIUM,MAG SULFATES 17.5-3.13G
SOLUTION, RECONSTITUTED, ORAL ORAL
Qty: 354 ML | Refills: 0 | OUTPATIENT
Start: 2021-05-20

## 2021-05-20 RX ORDER — SODIUM, POTASSIUM,MAG SULFATES 17.5-3.13G
SOLUTION, RECONSTITUTED, ORAL ORAL
Qty: 354 ML | Refills: 0 | Status: SHIPPED | OUTPATIENT
Start: 2021-05-20 | End: 2021-06-02

## 2021-05-20 NOTE — TELEPHONE ENCOUNTER
Called and spoke with patient  Sent new instructions for mag citrate, miralax, dulcolax and gatorade

## 2021-05-26 ENCOUNTER — OFFICE VISIT (OUTPATIENT)
Dept: SURGERY | Facility: CLINIC | Age: 51
End: 2021-05-26

## 2021-05-26 DIAGNOSIS — K80.00 CALCULUS OF GALLBLADDER WITH ACUTE CHOLECYSTITIS: Primary | ICD-10-CM

## 2021-05-26 PROCEDURE — 99024 POSTOP FOLLOW-UP VISIT: CPT | Performed by: SURGERY

## 2021-05-26 NOTE — PROGRESS NOTES
Assessment/Plan:    Diagnoses and all orders for this visit:    Calculus of gallbladder with acute cholecystitis      Overall doing quite well status post laparoscopic cholecystectomy  Still having some loose stools  I asked increase her fiber intake  If this is still bothersome over the next 3-4 weeks, asked her to give me a call with an update  Pathology: Reviewed with patient, all questions answered  Postoperative restrictions reviewed  All questions answered  ______________________________________________________  HPI: Patient presents post operatively  Laparoscopic cholecystectomy 5/11/2021   Final Diagnosis  A  Gall Bladder, Laparoscopic Cholecystectomy:  - Chronic cholecystitis with cholelithiasis and cholesterolosis  ROS:  General ROS: negative for - chills, fatigue, fever or night sweats, weight loss  Respiratory ROS: no cough, shortness of breath, or wheezing  Cardiovascular ROS: no chest pain or dyspnea on exertion  Genito-Urinary ROS: no dysuria, trouble voiding, or hematuria  Musculoskeletal ROS: negative for - gait disturbance, joint pain or muscle pain  Neurological ROS: no TIA or stroke symptoms  GI ROS: see HPI  Skin ROS: no new rashes or lesions   Lymphatic ROS: no new adenopathy noted by pt     GYN ROS: see HPI, no new GYN history or bleeding noted  Psy ROS: no new mental or behavioral disturbances         Patient Active Problem List   Diagnosis    Encounter for gynecological examination (general) (routine) without abnormal findings    Vasomotor symptoms due to menopause    Allergy to environmental factors    Dyspnea on exertion    NSVT (nonsustained ventricular tachycardia) (HCC)    Hypertension, essential    Vocal cord dysfunction    Elevated IgE level    Allergic conjunctivitis of both eyes    Allergic rhinitis due to house dust mite    Chronic seasonal allergic rhinitis due to pollen    Intrinsic atopic dermatitis    Mild intermittent asthma without complication    Routine gynecological examination    Gastroesophageal reflux disease    Calculus of gallbladder with acute cholecystitis       Allergies:  Morphine and Other      Current Outpatient Medications:     amLODIPine (NORVASC) 5 mg tablet, TK 1 T PO QD, Disp: , Rfl: 6    budesonide-formoterol (Symbicort) 80-4 5 MCG/ACT inhaler, Inhale 2 puffs 2 (two) times a day, Disp: , Rfl:     Cholecalciferol (VITAMIN D3) 77512 units CAPS, Take by mouth, Disp: , Rfl:     desloratadine (CLARINEX) 5 MG tablet, Take 1 tablet (5 mg total) by mouth daily (Patient not taking: Reported on 5/11/2021), Disp: 90 tablet, Rfl: 3    escitalopram (LEXAPRO) 10 mg tablet, Take 10 mg by mouth daily, Disp: , Rfl:     levalbuterol (XOPENEX HFA) 45 mcg/act inhaler, Inhale 1-2 puffs, Disp: , Rfl:     metoprolol succinate (TOPROL-XL) 50 mg 24 hr tablet, Take 50 mg by mouth daily, Disp: , Rfl:     Multiple Vitamins-Minerals (VITAMIN D3 COMPLETE PO), Take 50,000 Units by mouth, Disp: , Rfl:     Na Sulfate-K Sulfate-Mg Sulf (Suprep Bowel Prep Kit) 17 5-3 13-1 6 GM/177ML SOLN, Take by mouth as directed by the office for colonoscopy prep evening prior to procedure , Disp: 354 mL, Rfl: 0    spironolactone (ALDACTONE) 50 mg tablet, TAKE 1 TABLET BY MOUTH DAILY FOR 2 WEEKS   THEN INCREASE TO 1 TABLET TWICE A DAY THEREAFTER , Disp: , Rfl: 1    Sulfacetamide Sodium, Acne, 10 % LOTN, , Disp: , Rfl:     sulfamethoxazole-trimethoprim (BACTRIM DS) 800-160 mg per tablet, TK 1 T PO Q 12 H, Disp: , Rfl:     tiotropium (SPIRIVA RESPIMAT) 1 25 MCG/ACT AERS inhaler, Inhale 2 puffs daily, Disp: 1 Inhaler, Rfl: 0    Past Medical History:   Diagnosis Date    Acne     Allergic rhinitis     Chronic urticaria     Hypertension     Nasal congestion     Vitamin D deficiency     Wears glasses        Past Surgical History:   Procedure Laterality Date    APPENDECTOMY      CHOLECYSTECTOMY LAPAROSCOPIC N/A 5/11/2021    Procedure: CHOLECYSTECTOMY LAPAROSCOPIC: POSSIBLE OPEN;  Surgeon: Shonna Bond DO;  [facility];  Service: General    COLONOSCOPY      HYSTERECTOMY      partial = age 27    [address]      x 3    MAMMO (HISTORICAL)      SHOULDER SURGERY Right 05/24/2017    TOTAL ABDOMINAL HYSTERECTOMY W/ BILATERAL SALPINGOOPHORECTOMY      age 37       Family History   Problem Relation Age of Onset    Hypertension Mother     Hypertension Father     Breast cancer Maternal Aunt     Ovarian cancer Maternal Aunt     Diabetes Maternal Aunt     Colon cancer Maternal Uncle     Lung cancer Maternal Uncle     No Known Problems Sister     Hypertension Sister     Sickle cell anemia Sister     No Known Problems Brother         reports that she quit smoking about 21 months ago  Her smoking use included cigarettes  She smoked 0 50 packs per day  She has never used smokeless tobacco  She reports current alcohol use  She reports that she does not use drugs  PHYSICAL EXAM    There were no vitals taken for this visit      General: normal, cooperative, no distress  Abdominal: soft, nondistended or nontender  Incision: clean, dry, and intact and healing well      Shonna Bond DO    Date: 5/26/2021 Time: 10:25 AM

## 2021-06-01 ENCOUNTER — ANESTHESIA EVENT (OUTPATIENT)
Dept: GASTROENTEROLOGY | Facility: AMBULATORY SURGERY CENTER | Age: 51
End: 2021-06-01

## 2021-06-01 LAB — EXT SARS-COV-2: NOT DETECTED

## 2021-06-02 RX ORDER — FEXOFENADINE HCL 180 MG/1
180 TABLET ORAL DAILY
COMMUNITY

## 2021-06-02 RX ORDER — DIPHENHYDRAMINE HCL 25 MG
25 CAPSULE ORAL EVERY 6 HOURS PRN
COMMUNITY

## 2021-06-02 RX ORDER — MELATONIN
1000 4 TIMES DAILY
COMMUNITY

## 2021-06-04 ENCOUNTER — ANESTHESIA (OUTPATIENT)
Dept: GASTROENTEROLOGY | Facility: AMBULATORY SURGERY CENTER | Age: 51
End: 2021-06-04

## 2021-06-04 ENCOUNTER — HOSPITAL ENCOUNTER (OUTPATIENT)
Dept: GASTROENTEROLOGY | Facility: AMBULATORY SURGERY CENTER | Age: 51
Discharge: HOME | End: 2021-06-04
Payer: COMMERCIAL

## 2021-06-04 VITALS
BODY MASS INDEX: 30.36 KG/M2 | WEIGHT: 205 LBS | DIASTOLIC BLOOD PRESSURE: 71 MMHG | SYSTOLIC BLOOD PRESSURE: 105 MMHG | RESPIRATION RATE: 16 BRPM | HEIGHT: 69 IN | HEART RATE: 72 BPM | TEMPERATURE: 97.9 F | OXYGEN SATURATION: 99 %

## 2021-06-04 DIAGNOSIS — Z86.010 HISTORY OF COLON POLYPS: ICD-10-CM

## 2021-06-04 DIAGNOSIS — D12.6 TUBULAR ADENOMA OF COLON: ICD-10-CM

## 2021-06-04 PROCEDURE — 45380 COLONOSCOPY AND BIOPSY: CPT | Performed by: INTERNAL MEDICINE

## 2021-06-04 PROCEDURE — 88305 TISSUE EXAM BY PATHOLOGIST: CPT | Performed by: PATHOLOGY

## 2021-06-04 PROCEDURE — 45385 COLONOSCOPY W/LESION REMOVAL: CPT | Performed by: INTERNAL MEDICINE

## 2021-06-04 PROCEDURE — 00811 ANES LWR INTST NDSC NOS: CPT | Performed by: NURSE ANESTHETIST, CERTIFIED REGISTERED

## 2021-06-04 RX ORDER — SODIUM CHLORIDE 9 MG/ML
30 INJECTION, SOLUTION INTRAVENOUS CONTINUOUS
Status: DISCONTINUED | OUTPATIENT
Start: 2021-06-04 | End: 2021-06-08 | Stop reason: HOSPADM

## 2021-06-04 RX ORDER — PROPOFOL 10 MG/ML
INJECTION, EMULSION INTRAVENOUS AS NEEDED
Status: DISCONTINUED | OUTPATIENT
Start: 2021-06-04 | End: 2021-06-04

## 2021-06-04 RX ORDER — SODIUM CHLORIDE 9 MG/ML
20 INJECTION, SOLUTION INTRAVENOUS CONTINUOUS
Status: DISCONTINUED | OUTPATIENT
Start: 2021-06-04 | End: 2021-06-08 | Stop reason: HOSPADM

## 2021-06-04 RX ADMIN — SODIUM CHLORIDE: 9 INJECTION, SOLUTION INTRAVENOUS at 11:21

## 2021-06-04 RX ADMIN — PROPOFOL 200 MG: 10 INJECTION, EMULSION INTRAVENOUS at 11:24

## 2021-06-04 RX ADMIN — PROPOFOL 50 MG: 10 INJECTION, EMULSION INTRAVENOUS at 11:35

## 2021-06-04 RX ADMIN — PROPOFOL 50 MG: 10 INJECTION, EMULSION INTRAVENOUS at 11:30

## 2021-06-04 RX ADMIN — PROPOFOL 50 MG: 10 INJECTION, EMULSION INTRAVENOUS at 11:31

## 2021-06-04 RX ADMIN — PROPOFOL 50 MG: 10 INJECTION, EMULSION INTRAVENOUS at 11:27

## 2021-06-04 NOTE — H&P
History and Physical - [facility]  Keily Banuelos 46 y o  female MRN: [id number]                  HPI: Keily Banuelos is a 46y o  year old female who presents for history of adenoma polyps 3 years ago      REVIEW OF SYSTEMS: Per the HPI, and otherwise unremarkable      Historical Information   Past Medical History:   Diagnosis Date    Acne     Allergic rhinitis     Anxiety     Chronic urticaria     Colon polyp     Hypertension     Nasal congestion     Vitamin D deficiency     Wears glasses      Past Surgical History:   Procedure Laterality Date    APPENDECTOMY      CHOLECYSTECTOMY      CHOLECYSTECTOMY LAPAROSCOPIC N/A 2021    Procedure: CHOLECYSTECTOMY LAPAROSCOPIC: POSSIBLE OPEN;  Surgeon: Jan Flores DO;  [facility];  Service: General    COLONOSCOPY      HYSTERECTOMY      partial = age 27    [address]      x 3    MAMMO (HISTORICAL)      SHOULDER SURGERY Right 2017    rotator cuff repair    TOTAL ABDOMINAL HYSTERECTOMY W/ BILATERAL SALPINGOOPHORECTOMY      age 37     Social History   Social History     Substance and Sexual Activity   Alcohol Use Yes    Frequency: 2-4 times a month    Drinks per session: 1 or 2    Comment: social     Social History     Substance and Sexual Activity   Drug Use No     Social History     Tobacco Use   Smoking Status Former Smoker    Packs/day: 0 50    Types: Cigarettes    Quit date: 2019    Years since quittin 8   Smokeless Tobacco Never Used   Tobacco Comment    1 PPD per Verena Mcclain     Family History   Problem Relation Age of Onset    Hypertension Mother     Hypertension Father     Breast cancer Maternal Aunt     Ovarian cancer Maternal Aunt     Diabetes Maternal Aunt     Stomach cancer Maternal Aunt     Colon cancer Maternal Uncle     Lung cancer Maternal Uncle     No Known Problems Sister     Hypertension Sister     Sickle cell anemia Sister     No Known Problems Brother Meds/Allergies       Current Outpatient Medications:     BIOTIN PO    cholecalciferol (VITAMIN D3) 1,000 units tablet    desloratadine (CLARINEX) 5 MG tablet    diphenhydrAMINE (BENADRYL) 25 mg capsule    escitalopram (LEXAPRO) 10 mg tablet    fexofenadine (ALLEGRA) 180 MG tablet    metoprolol succinate (TOPROL-XL) 50 mg 24 hr tablet    Multiple Vitamins-Minerals (VITAMIN D3 COMPLETE PO)    Multiple Vitamins-Minerals (ZINC PO)    Current Facility-Administered Medications:     sodium chloride 0 9 % infusion, 30 mL/hr, Intravenous, Continuous    sodium chloride 0 9 % infusion, 20 mL/hr, Intravenous, Continuous    Allergies   Allergen Reactions    Morphine Rash     vomitting  vomitting      Other Itching     Environmental, Nuts-- throat itching       Objective     /80   Pulse 85   Temp 97 9 °F (36 6 °C) (Temporal)   Resp 18   Ht 5' 9" (1 753 m)   Wt 93 kg (205 lb)   SpO2 96%   BMI 30 27 kg/m²       PHYSICAL EXAM    Gen: NAD  Head: NCAT  CV: RRR  CHEST: Clear  ABD: soft, NT/ND  EXT: no edema      ASSESSMENT/PLAN:  This is a 46y o  year old female here for colonoscopy, and she is stable and optimized for her procedure

## 2021-06-04 NOTE — ANESTHESIA PREPROCEDURE EVALUATION
Procedure:  COLONOSCOPY    Relevant Problems   ANESTHESIA (within normal limits)      CARDIO   (+) Dyspnea on exertion   (+) Hypertension      ENDO (within normal limits)      GI/HEPATIC   (+) Gastroesophageal reflux disease      /RENAL (within normal limits)      GYN (within normal limits)      HEMATOLOGY (within normal limits)      MUSCULOSKELETAL (within normal limits)      NEURO/PSYCH   (+) Anxiety      PULMONARY   (+) Dyspnea on exertion      Other   (+) Colon polyp   (+) NSVT (nonsustained ventricular tachycardia) (HCC)   (+) Vocal cord dysfunction        Physical Exam    Airway    Mallampati score: II  TM Distance: >3 FB  Neck ROM: full     Dental   No notable dental hx     Cardiovascular  Cardiovascular exam normal    Pulmonary  Pulmonary exam normal     Other Findings  Denies asthma or SOB      Anesthesia Plan  ASA Score- 2     Anesthesia Type- IV sedation with anesthesia with ASA Monitors  Additional Monitors:   Airway Plan:           Plan Factors-Exercise tolerance (METS): >4 METS  Chart reviewed  Patient is not a current smoker  Induction- intravenous  Postoperative Plan-     Informed Consent- Anesthetic plan and risks discussed with patient

## 2021-06-04 NOTE — DISCHARGE INSTRUCTIONS
Colonoscopy   WHAT YOU NEED TO KNOW:   A colonoscopy is a procedure to examine the inside of your colon (intestine) with a scope  Polyps or tissue growths may have been removed during your colonoscopy  It is normal to feel bloated and to have some abdominal discomfort  You should be passing gas  If you have hemorrhoids or you had polyps removed, you may have a small amount of bleeding  DISCHARGE INSTRUCTIONS:   Seek care immediately if:    You have sudden, severe abdominal pain   You have problems swallowing   You have a large amount of black, sticky bowel movements or blood in your bowel movements   You have sudden trouble breathing   You feel weak, lightheaded, or faint or your heart beats faster than normal for you  Contact your healthcare provider if:    You have a fever and chills   You have nausea or are vomiting   Your abdomen is bloated or feels full and hard   You have abdominal pain   You have black, sticky bowel movements or blood in your bowel movements   You have not had a bowel movement for 3 days after your procedure   You have rash or hives   You have questions or concerns about your procedure  Activity:    Do not lift, strain, or run for 24 hours after your procedure   Rest after your procedure  You have been given medicine to relax you  Do not drive or make important decisions until the day after your procedure  Return to your normal activity as directed   Relieve gas and discomfort from bloating by lying on your right side with a heating pad on your abdomen  You may need to take short walks to help the gas move out  Eat small meals until bloating is relieved  Follow up with your healthcare provider as directed: Write down your questions so you remember to ask them during your visits  If you take a blood thinner, please review the specific instructions from your endoscopist about when you should resume it   These can be found in the Recommendation and Your Medication list sections of this After Visit Summary  Diverticulosis   WHAT YOU NEED TO KNOW:   What is diverticulosis? Diverticulosis is a condition that causes small pockets called diverticula to form in your intestine  These pockets make it difficult for bowel movements to pass through your digestive system  What causes diverticulosis? Diverticula form when muscles have to work hard to move bowel movements through the intestine  The force causes bulges to form at weak areas in the intestine  This may happen if you eat foods that are low in fiber  Fiber helps give your bowel movements more bulk so they are larger and easier to move through your colon  The following may increase your risk of diverticulosis:  · A history of constipation    · Age 36 or older    · Obesity    · Lack of exercise    What are the signs and symptoms of diverticulosis? Diverticulosis usually does not cause any signs or symptoms  It may cause any of the following in some people:  · Pain or discomfort in your lower abdomen    · Abdominal bloating    · Constipation or diarrhea    How is diverticulosis diagnosed? Your healthcare provider will examine you and ask about your bowel movements, diet, and symptoms  He or she will also ask about any medical conditions you have or medicines you take  You may need any of the following:  · Blood tests  may be done to check for signs of inflammation  · A barium enema  is an x-ray of your colon that may show diverticula  A tube is put into your anus, and a liquid called barium is put through the tube  Barium is used so that healthcare providers can see your colon more clearly  · Flexible sigmoidoscopy  is a test to look for any changes in your lower intestines and rectum  It may also show the cause of any bleeding or pain  A soft, bendable tube with a light on the end will be put into your anus  It will then be moved forward into your intestine  · A colonoscopy  is used to look at your whole colon  A scope (long bendable tube with a light on the end) is used to take pictures  This test may show diverticula  · A CT scan , or CAT scan, may show diverticula  You may be given contrast liquid before the scan  Tell the healthcare provider if you have ever had an allergic reaction to contrast liquid  How is diverticulosis managed? The goal of treatment is to manage any symptoms you have and prevent other problems such as diverticulitis  Diverticulitis is swelling or infection of the diverticula  Your healthcare provider may recommend any of the following:  · Eat a variety of high-fiber foods  High-fiber foods help you have regular bowel movements  High-fiber foods include cooked beans, fruits, vegetables, and some cereals  Most adults need 25 to 35 grams of fiber each day  Your healthcare provider may recommend that you have more  Ask your healthcare provider how much fiber you need  Increase fiber slowly  You may have abdominal discomfort, bloating, and gas if you add fiber to your diet too quickly  You may need to take a fiber supplement if you are not getting enough fiber from food  · Medicines  to soften your bowel movements may be given  You may also need medicines to treat symptoms such as bloating and pain  · Drink liquids as directed  You may need to drink 2 to 3 liters (8 to 12 cups) of liquids every day  Ask your healthcare provider how much liquid to drink each day and which liquids are best for you  · Apply heat  on your abdomen for 20 to 30 minutes every 2 hours for as many days as directed  Heat helps decrease pain and muscle spasms  How can I help prevent diverticulitis or other symptoms? The following may help decrease your risk for diverticulitis or symptoms, such as bleeding  Talk to your provider about these or other things you can do to prevent problems that may occur with diverticulosis  · Exercise regularly  Ask your healthcare provider about the best exercise plan for you  Exercise can help you have regular bowel movements  Get 30 minutes of exercise on most days of the week  · Maintain a healthy weight  Ask your healthcare provider how much you should weigh  Ask him or her to help you create a weight loss plan if you are overweight  · Do not smoke  Nicotine and other chemicals in cigarettes increase your risk for diverticulitis  Ask your healthcare provider for information if you currently smoke and need help to quit  E-cigarettes or smokeless tobacco still contain nicotine  Talk to your healthcare provider before you use these products  · Ask your healthcare provider if it is safe to take NSAIDs  NSAIDs may increase your risk of diverticulitis  When should I seek immediate care? · You have severe pain on the left side of your lower abdomen  · Your bowel movements are bright or dark red  When should I contact my healthcare provider? · You have a fever and chills  · You feel dizzy or lightheaded  · You have nausea, or you are vomiting  · You have a change in your bowel movements  · You have questions or concerns about your condition or care  CARE AGREEMENT:   You have the right to help plan your care  Learn about your health condition and how it may be treated  Discuss treatment options with your healthcare providers to decide what care you want to receive  You always have the right to refuse treatment  The above information is an  only  It is not intended as medical advice for individual conditions or treatments  Talk to your doctor, nurse or pharmacist before following any medical regimen to see if it is safe and effective for you  © Copyright [address] Information is for End User's use only and may not be sold, redistributed or otherwise used for commercial purposes   All illustrations and images included in CareNotes® are the copyrighted property of [employer]  or [facility] Fiber Diet   AMBULATORY CARE:   A high-fiber diet  includes foods that have a high amount of fiber  Fiber is the part of fruits, vegetables, and grains that is not broken down by your body  Fiber keeps your bowel movements regular  Fiber can also help lower your cholesterol level, control blood sugar in people with diabetes, and relieve constipation  Fiber can also help you control your weight because it helps you feel full faster  Most adults should eat 25 to 35 grams of fiber each day  Talk to your dietitian or healthcare provider about the amount of fiber you need  Good sources of fiber:       · Foods with at least 4 grams of fiber per serving:      ? ? to ½ cup of high-fiber cereal (check the nutrition label on the box)    ? ½ cup of blackberries or raspberries    ? 4 dried prunes    ? 1 cooked artichoke    ? ½ cup of cooked legumes, such as lentils, or red, kidney, and pinzon beans    · Foods with 1 to 3 grams of fiber per serving:      ? 1 slice of whole-wheat, pumpernickel, or rye bread    ? ½ cup of cooked brown rice    ? 4 whole-wheat crackers    ? 1 cup of oatmeal    ? ½ cup of cereal with 1 to 3 grams of fiber per serving (check the nutrition label on the box)    ? 1 small piece of fruit, such as an apple, banana, pear, kiwi, or orange    ? 3 dates    ? ½ cup of canned apricots, fruit cocktail, peaches, or pears    ? ½ cup of raw or cooked vegetables, such as carrots, cauliflower, cabbage, spinach, squash, or corn  Ways that you can increase fiber in your diet:   · Choose brown or wild rice instead of white rice  · Use whole wheat flour in recipes instead of white or all-purpose flour  · Add beans and peas to casseroles or soups  · Choose fresh fruit and vegetables with peels or skins on instead of juices  Other diet guidelines to follow:   · Add fiber to your diet slowly    You may have abdominal discomfort, bloating, and gas if you add fiber to your diet too quickly  · Drink plenty of liquids as you add fiber to your diet  You may have nausea or develop constipation if you do not drink enough water  Ask how much liquid to drink each day and which liquids are best for you  © Copyright [address] Information is for End User's use only and may not be sold, redistributed or otherwise used for commercial purposes  All illustrations and images included in CareNotes® are the copyrighted property of A D A M , Inc  or [facility] Kyle Wong   The above information is an  only  It is not intended as medical advice for individual conditions or treatments  Talk to your doctor, nurse or pharmacist before following any medical regimen to see if it is safe and effective for you  Colorectal Polyps   WHAT YOU NEED TO KNOW:   What are colorectal polyps? Colorectal polyps are small growths of tissue in the lining of the colon and rectum  Most polyps are hyperplastic polyps and are usually benign (noncancerous)  Certain types of polyps, called adenomatous polyps, may turn into cancer  What increases my risk of colorectal polyps? The exact cause of colorectal polyps is unknown  The following may increase your risk:  · Older age    · A diet of foods high in fat and low in fiber     · Family history of polyps    · Intestinal diseases, such as Crohn's disease or ulcerative colitis    · An unhealthy lifestyle, such as physical inactivity, smoking, or drinking alcohol    · Obesity    What are the signs and symptoms of colorectal polyps? · Blood in your bowel movement or bleeding from the rectum    · Change in bowel movement habits, such as diarrhea and constipation    · Abdominal pain    How are colorectal polyps diagnosed? You should have fecal blood screening once a year for colorectal disease if you are over 48years old  You should be screened earlier if you have an intestinal disease or a family history of polyps or colorectal cancer  During this screening, a sample of your bowel movement is checked for blood, which may be an early sign of colorectal polyps or cancer  You may also need any of the following tests:  · Digital rectal exam:  Your healthcare provider will examine your anus and use a finger to check your rectum for polyps  · Barium enema: A barium enema is an x-ray of the colon  A tube is put into your anus, and a liquid called barium is put through the tube  Barium is used so that healthcare providers can see your colon better on the x-ray film  · Virtual colonoscopy: This is a CT scan that takes pictures of the inside of your colon and rectum  A small, flexible tube is put into your rectum and air or carbon dioxide (gas) is used to expand your colon  This lets healthcare providers clearly see your colon and any polyps on a monitor  · Colonoscopy or sigmoidoscopy: These procedures help your healthcare provider see the inside of your colon using a flexible tube with a small light and camera on the end  During a sigmoidoscopy, your healthcare provider will only look at rectum and lower colon  During a colonoscopy, healthcare providers will look at the full length of your colon  Healthcare providers may remove a small amount of tissue from the colon for a biopsy  How are colorectal polyps treated? A polypectomy is a minimally invasive procedure to remove your polyps  They may be removed during a colonoscopy or sigmoidoscopy  Your healthcare provider may need to remove the polyps with a laparoscope  Laparoscopy is done by inserting a small, flexible scope into incisions made on your abdomen  What are the risks of colorectal polyps? You may bleed during a colonoscopy procedure  Your bowel may be perforated (torn) when polyps are removed  This may lead to an open abdominal surgery  During surgery, you may bleed too much or get an infection   Adenomatous polyps that are not removed may turn into cancer and become more difficult to treat  Where can I find support and more information? · [address] ([facility])  [address] , [place] [address]  Phone: [phone]  Web Address: Fernando Douglas  [facility] gov    When should I contact my healthcare provider? · You have a fever  · You have chills, a cough, or feel weak and achy  · You have abdominal pain that does not go away or gets worse after you take medicine  · Your abdomen is swollen  · You are losing weight without trying  · You have questions or concerns about your condition or care  When should I seek immediate care or call 911? · You have sudden shortness of breath  · You have a fast heart rate, fast breathing, or are too dizzy to stand up  · You have severe abdominal pain  · You see blood in your bowel movement  CARE AGREEMENT:   You have the right to help plan your care  Learn about your health condition and how it may be treated  Discuss treatment options with your healthcare providers to decide what care you want to receive  You always have the right to refuse treatment  The above information is an  only  It is not intended as medical advice for individual conditions or treatments  Talk to your doctor, nurse or pharmacist before following any medical regimen to see if it is safe and effective for you  © Copyright [address] Information is for End User's use only and may not be sold, redistributed or otherwise used for commercial purposes   All illustrations and images included in CareNotes® are the copyrighted property of [employer]  or [address] [place] [address] [employer]

## 2021-06-04 NOTE — ANESTHESIA POSTPROCEDURE EVALUATION
Post-Op Assessment Note    CV Status:  Stable  Pain Score: 0    Pain management: adequate     Mental Status:  Alert and awake   Hydration Status:  Stable   PONV Controlled:  None   Airway Patency:  Patent      Post Op Vitals Reviewed: Yes      Staff: CRNA         No complications documented      BP   95/64   Temp      Pulse  71   Resp   16   SpO2   95

## 2021-10-01 ENCOUNTER — APPOINTMENT (OUTPATIENT)
Dept: LAB | Facility: HOSPITAL | Age: 51
End: 2021-10-01
Payer: COMMERCIAL

## 2021-10-01 DIAGNOSIS — E55.9 VITAMIN D DEFICIENCY: ICD-10-CM

## 2021-10-01 LAB
25(OH)D3 SERPL-MCNC: 25.6 NG/ML (ref 30–100)
EST. AVERAGE GLUCOSE BLD GHB EST-MCNC: 120 MG/DL
HBA1C MFR BLD: 5.8 %

## 2021-10-01 PROCEDURE — 83036 HEMOGLOBIN GLYCOSYLATED A1C: CPT

## 2021-10-01 PROCEDURE — 82306 VITAMIN D 25 HYDROXY: CPT

## 2021-10-01 PROCEDURE — 36415 COLL VENOUS BLD VENIPUNCTURE: CPT

## 2021-12-14 ENCOUNTER — HOSPITAL ENCOUNTER (OUTPATIENT)
Dept: RADIOLOGY | Facility: HOSPITAL | Age: 51
Discharge: HOME | End: 2021-12-14
Payer: COMMERCIAL

## 2021-12-14 VITALS — HEIGHT: 69 IN | BODY MASS INDEX: 30.36 KG/M2 | WEIGHT: 205 LBS

## 2021-12-14 DIAGNOSIS — Z12.31 ENCOUNTER FOR SCREENING MAMMOGRAM FOR MALIGNANT NEOPLASM OF BREAST: ICD-10-CM

## 2021-12-14 PROCEDURE — 77067 SCR MAMMO BI INCL CAD: CPT

## 2021-12-14 PROCEDURE — 77063 BREAST TOMOSYNTHESIS BI: CPT

## 2022-02-09 ENCOUNTER — APPOINTMENT (OUTPATIENT)
Dept: LAB | Facility: HOSPITAL | Age: 52
End: 2022-02-09
Attending: FAMILY MEDICINE
Payer: COMMERCIAL

## 2022-02-09 DIAGNOSIS — R35.0 URINARY FREQUENCY: ICD-10-CM

## 2022-02-09 LAB
BILIRUB UR QL STRIP: NEGATIVE
CLARITY UR: CLEAR
COLOR UR: YELLOW
GLUCOSE UR STRIP-MCNC: NEGATIVE MG/DL
HGB UR QL STRIP.AUTO: NEGATIVE
KETONES UR STRIP-MCNC: NEGATIVE MG/DL
LEUKOCYTE ESTERASE UR QL STRIP: NEGATIVE
NITRITE UR QL STRIP: NEGATIVE
PH UR STRIP.AUTO: 5.5 [PH]
PROT UR STRIP-MCNC: NEGATIVE MG/DL
SP GR UR STRIP.AUTO: 1.02 (ref 1–1.03)
UROBILINOGEN UR QL STRIP.AUTO: 0.2 E.U./DL

## 2022-02-09 PROCEDURE — 81003 URINALYSIS AUTO W/O SCOPE: CPT

## 2022-02-09 PROCEDURE — 87086 URINE CULTURE/COLONY COUNT: CPT

## 2022-02-10 LAB — BACTERIA UR CULT: NORMAL

## 2022-02-18 ENCOUNTER — APPOINTMENT (OUTPATIENT)
Dept: LAB | Facility: HOSPITAL | Age: 52
End: 2022-02-18
Payer: COMMERCIAL

## 2022-02-18 ENCOUNTER — HOSPITAL ENCOUNTER (OUTPATIENT)
Dept: ULTRASOUND IMAGING | Facility: HOSPITAL | Age: 52
Discharge: HOME | End: 2022-02-18
Payer: COMMERCIAL

## 2022-02-18 DIAGNOSIS — R73.01 IMPAIRED FASTING GLUCOSE: ICD-10-CM

## 2022-02-18 DIAGNOSIS — R10.9 ABDOMINAL PAIN, UNSPECIFIED ABDOMINAL LOCATION: ICD-10-CM

## 2022-02-18 DIAGNOSIS — R35.0 URINARY FREQUENCY: ICD-10-CM

## 2022-02-18 DIAGNOSIS — Z13.29 SCREENING FOR THYROID DISORDER: ICD-10-CM

## 2022-02-18 LAB
EST. AVERAGE GLUCOSE BLD GHB EST-MCNC: 123 MG/DL
HBA1C MFR BLD: 5.9 %
TSH SERPL DL<=0.05 MIU/L-ACNC: 1.4 UIU/ML (ref 0.34–5.6)

## 2022-02-18 PROCEDURE — 36415 COLL VENOUS BLD VENIPUNCTURE: CPT

## 2022-02-18 PROCEDURE — 83036 HEMOGLOBIN GLYCOSYLATED A1C: CPT

## 2022-02-18 PROCEDURE — 84443 ASSAY THYROID STIM HORMONE: CPT

## 2022-02-18 PROCEDURE — 76770 US EXAM ABDO BACK WALL COMP: CPT

## 2022-03-14 ENCOUNTER — TELEPHONE (OUTPATIENT)
Dept: GASTROENTEROLOGY | Facility: CLINIC | Age: 52
End: 2022-03-14

## 2022-03-14 NOTE — TELEPHONE ENCOUNTER
Patient left two messages asking for a call to schedule an appointment with Dr Paulina Jiang  I returned her call and had to leave message for her to call back

## 2022-03-16 ENCOUNTER — OFFICE VISIT (OUTPATIENT)
Dept: GASTROENTEROLOGY | Facility: CLINIC | Age: 52
End: 2022-03-16
Payer: COMMERCIAL

## 2022-03-16 VITALS
SYSTOLIC BLOOD PRESSURE: 127 MMHG | BODY MASS INDEX: 31.4 KG/M2 | HEART RATE: 91 BPM | WEIGHT: 212 LBS | HEIGHT: 69 IN | DIASTOLIC BLOOD PRESSURE: 80 MMHG

## 2022-03-16 DIAGNOSIS — K57.90 DIVERTICULOSIS: ICD-10-CM

## 2022-03-16 DIAGNOSIS — K21.9 GASTROESOPHAGEAL REFLUX DISEASE, UNSPECIFIED WHETHER ESOPHAGITIS PRESENT: ICD-10-CM

## 2022-03-16 DIAGNOSIS — R19.7 DIARRHEA, UNSPECIFIED TYPE: Primary | ICD-10-CM

## 2022-03-16 DIAGNOSIS — Z86.010 HISTORY OF COLON POLYPS: ICD-10-CM

## 2022-03-16 PROCEDURE — 99213 OFFICE O/P EST LOW 20 MIN: CPT | Performed by: NURSE PRACTITIONER

## 2022-03-16 RX ORDER — CHOLESTYRAMINE 4 G/9G
1 POWDER, FOR SUSPENSION ORAL
Qty: 30 PACKET | Refills: 0 | Status: SHIPPED | OUTPATIENT
Start: 2022-03-16 | End: 2022-04-09

## 2022-03-16 RX ORDER — CHOLESTYRAMINE 4 G/9G
1 POWDER, FOR SUSPENSION ORAL
Qty: 30 PACKET | Refills: 1 | Status: SHIPPED | OUTPATIENT
Start: 2022-03-16 | End: 2022-03-16

## 2022-03-16 NOTE — PATIENT INSTRUCTIONS
Nutrition Tips for Relief of Diarrhea   WHAT YOU NEED TO KNOW:   There are diet changes you can make to help relieve or stop diarrhea  These changes include limiting or avoiding foods and liquids that are high in sugar, fat, fiber, and lactose  Lactose is a sugar found in milk products  Milk products can cause diarrhea in people who are lactose intolerant  You should also drink extra liquids to replace fluids that are lost when you have diarrhea  Diarrhea can lead to dehydration  DISCHARGE INSTRUCTIONS:   Foods to limit or avoid:   · Dairy:      ? Whole milk    ? Half-and-half, cream, and sour cream    ? Regular (whole milk) ice cream    · Grains:      ? Whole wheat and whole grain breads, pasta, cereals, and crackers    ? Lolly Pinks and wild rice    ? Breads and cereals with seeds or nuts    ? Popcorn    · Fruit and vegetables:      ? All raw fruits, except bananas and melon    ? Dried fruits, including prunes and raisins    ? Canned fruit in heavy syrup    ? Prune juice and any fruit juice with pulp    ? Raw vegetables, except lettuce     ? Fried vegetables    ? Corn, raw and cooked broccoli, cabbage, cauliflower, and daniel greens    · Protein:      ? Fried meat, poultry, and fish    ? High-fat luncheon meats, such as bologna    ? Fatty meats, such as sausage, aguilar, and hot dogs    ? Beans and nuts    · Liquids:      ? Sodas and fruit-flavored drinks    ? Drinks that contain caffeine, such as energy drinks, coffee, and tea     ? Drinks that contain alcohol or sugar alcohol, such as sorbitol    Foods and liquids you may eat or drink:  Most people can tolerate the foods and liquids listed below  If any of them make your symptoms worse, stop eating or drinking them until you feel better  If you are lactose intolerant, avoid milk products  · Dairy:      ? Skim or low-fat milk or evaporated milk    ? Soy milk or buttermilk     ? Low-fat, part-skim, and aged cheese    ?  Yogurt, low-fat ice cream, or sherbert    · Grains:  (Choose foods with less than 2 grams of dietary fiber per serving )     ? White or refined flour breads, bagels, pasta, and crackers    ? Cold or hot cereals made from white or refined flour such as puffed rice, cornflakes, or cream of wheat    ? White rice    · Fruit and vegetables:      ? Bananas or melon    ? Fruit juice without pulp, except prune juice    ? Canned fruit in juice or light syrup    ? Lettuce and most well-cooked vegetables without seeds or skins     ? Strained vegetable juice    · Protein:      ? Tender, well-cooked meat, poultry, or fish    ? Well-cooked eggs or soy foods (cooked without added fat)    ? Smooth nut butters    · Fats:  (Limit fats to less than 8 teaspoons a day)     ? Oil, butter, or margarine, or mayonnaise    ? Cream cheese or salad dressings    · Liquids:      ? For infants, breast milk or formula    ? Oral rehydration solution     ? Decaffeinated coffee or caffeine-free teas    ? Soft drinks without caffeine    Other guidelines to follow:   · Drink liquids as directed  You may need to drink more liquids than usual to prevent dehydration  Ask how much liquid to drink each day and which liquids are best for you  You may need to drink an oral rehydration solution (ORS)  An ORS helps replace fluids and electrolytes that you lose when you have diarrhea  · Eat small meals or snacks every 3 to 4 hours  instead of large meals  Continue eating even if you still have diarrhea  Your diarrhea will continue for a few days but should gradually go away  © Copyright [employer] 2022 Information is for End User's use only and may not be sold, redistributed or otherwise used for commercial purposes  All illustrations and images included in CareNotes® are the copyrighted property of [employer]  or [facility] Kyle Wong   The above information is an  only  It is not intended as medical advice for individual conditions or treatments   Talk to your doctor, nurse or pharmacist before following any medical regimen to see if it is safe and effective for you  GERD (Gastroesophageal Reflux Disease)   WHAT YOU NEED TO KNOW:   Gastroesophageal reflux disease (GERD) is reflux that happens more than 2 times a week for a few weeks  Reflux means acid and food in your stomach back up into your esophagus  GERD can cause other health problems over time if it is not treated  DISCHARGE INSTRUCTIONS:   Call your local emergency number (911 in the [address] [facility]) if:   · You have severe chest pain and sudden trouble breathing  Return to the emergency department if:   · You have trouble breathing after you vomit  · You have trouble swallowing, or pain with swallowing  · Your bowel movements are black, bloody, or tarry-looking  · Your vomit looks like coffee grounds or has blood in it  Call your doctor or gastroenterologist if:   · You feel full and cannot burp or vomit  · You vomit large amounts, or you vomit often  · You are losing weight without trying  · Your symptoms get worse or do not improve with treatment  · You have questions or concerns about your condition or care  Medicines:   · Medicines  are used to decrease stomach acid  Medicine may also be used to help your lower esophageal sphincter and stomach contract (tighten) more  · Take your medicine as directed  Contact your healthcare provider if you think your medicine is not helping or if you have side effects  Tell him of her if you are allergic to any medicine  Keep a list of the medicines, vitamins, and herbs you take  Include the amounts, and when and why you take them  Bring the list or the pill bottles to follow-up visits  Carry your medicine list with you in case of an emergency  Manage GERD:       · Do not have foods or drinks that may increase heartburn    These include chocolate, peppermint, fried or fatty foods, drinks that contain caffeine, or carbonated drinks (soda)  Other foods include spicy foods, onions, tomatoes, and tomato-based foods  Do not have foods or drinks that can irritate your esophagus, such as citrus fruits, juices, and alcohol  · Do not eat large meals  When you eat a lot of food at one time, your stomach needs more acid to digest it  Eat 6 small meals each day instead of 3 large ones, and eat slowly  Do not eat meals 2 to 3 hours before bedtime  · Elevate the head of your bed  Place 6-inch blocks under the head of your bed frame  You may also use more than one pillow under your head and shoulders while you sleep  · Maintain a healthy weight  If you are overweight, weight loss may help relieve symptoms of GERD  · Do not smoke  Smoking weakens the lower esophageal sphincter and increases the risk of GERD  Ask your healthcare provider for information if you currently smoke and need help to quit  E-cigarettes or smokeless tobacco still contain nicotine  Talk to your healthcare provider before you use these products  · Do not put pressure on your abdomen  Pressure pushes acid up into your esophagus  Do not wear clothing that is tight around your waist  Do not bend over  Bend at the knees if you need to pick something up  Follow up with your doctor or gastroenterologist as directed:  Write down your questions so you remember to ask them during your visits  © Copyright [employer] 2022 Information is for End User's use only and may not be sold, redistributed or otherwise used for commercial purposes  All illustrations and images included in CareNotes® are the copyrighted property of A D A M , Inc  or Kala Wong   The above information is an  only  It is not intended as medical advice for individual conditions or treatments  Talk to your doctor, nurse or pharmacist before following any medical regimen to see if it is safe and effective for you

## 2022-03-16 NOTE — PROGRESS NOTES
[facility] - Outpatient Follow-up Note  Anastasiya Singh 46 y o  female MRN: [id number]  Encounter: [id number]          ASSESSMENT AND PLAN:      1  Diarrhea, unspecified type  Patient reports she normally has 3 formed bowel movements a day usually after meals, after getting her gallbladder out in May she started having liquid brown stool and urgency after meals  Denies any change in the frequency, abdominal pain, nighttime awakenings, blood in stool, recent antibiotic use, or changes in medication  Thyroid was normal on 2/18  She is up-to-date on colon cancer screening  Suspect bile salt diarrhea, will order trial of Questran daily with dinner  Avoid dairy  Will follow-up in 4-6 weeks  Next refill of medication would need to go to her mail away pharmacy for 90 day supply  -     cholestyramine (QUESTRAN) 4 g packet; Take 1 packet (4 g total) by mouth daily before dinner    2  Gastroesophageal reflux disease, unspecified whether esophagitis present  Reports reflux once weekly controlled with Tums  She is working on weight loss  Reports she can 40 lb since she quit smoking 3 years ago  Anti-reflux diet and lifestyle measures discussed  -continue antacids as needed    3  Diverticulosis  4  History of colon polyps  Last colonoscopy in June 2021 showed 1 large and 1 small polyp removed as well as sigmoid diverticulosis  Biopsy showed tubular adenoma, negative for high-grade dysplasia  Repeat colonoscopy is due in June 2024  Follow-up in the office in 4-6 weeks  ______________________________________________________________________    SUBJECTIVE:  This is a 49-year-old female who presents for follow-up due to change in bowel habits/diarrhea  Normally she goes after eating three times a day and is formed  Now, having runny stool with urgency since gallbladder was removed in May 2021   Denies any abdominal pain, change in frequency, blood in the stool, or waking up at night to move her bowels  No recent antibiotic use  She was given something by her PCP but was too expensive  Last colonoscopy was done in 2021 which showed 1 large and 1 small polyp removed as well as sigmoid diverticulosis  Biopsy showed tubular adenoma, negative for high-grade dysplasia  Repeat colonoscopy due in 2024  Denies excess caffeine  Drinks 1 shot of liquor daily  Former tobacco use but quit 3 years ago  No drug use  Has gained 40 pounds since she stopped smoking  Has reflux once a week controlled with Tums  REVIEW OF SYSTEMS IS OTHERWISE NEGATIVE        Historical Information   Past Medical History:   Diagnosis Date    Acne     Allergic rhinitis     Anxiety     Chronic urticaria     Colon polyp     Hypertension     Nasal congestion     Vitamin D deficiency     Wears glasses      Past Surgical History:   Procedure Laterality Date    APPENDECTOMY      CHOLECYSTECTOMY      CHOLECYSTECTOMY LAPAROSCOPIC N/A 2021    Procedure: CHOLECYSTECTOMY LAPAROSCOPIC: POSSIBLE OPEN;  Surgeon: Mireya Bird DO;  [facility];  Service: General    COLONOSCOPY      HYSTERECTOMY      partial = age 27    LAPAROSCOPIC OVARIAN CYSTECTOMY      x 3    MAMMO (HISTORICAL)      SHOULDER SURGERY Right 2017    rotator cuff repair    TOTAL ABDOMINAL HYSTERECTOMY W/ BILATERAL SALPINGOOPHORECTOMY      age 37   [facility] UPPER GASTROINTESTINAL ENDOSCOPY       Social History   Social History     Substance and Sexual Activity   Alcohol Use Yes    Comment: social     Social History     Substance and Sexual Activity   Drug Use No     Social History     Tobacco Use   Smoking Status Former Smoker    Packs/day: 0 50    Types: Cigarettes    Quit date: 2019    Years since quittin 5   Smokeless Tobacco Never Used   Tobacco Comment    1 PPD per Boy Mckeon     Family History   Problem Relation Age of Onset    Hypertension Mother     Hypertension Father     Breast cancer Maternal Aunt 36    Ovarian cancer Maternal Aunt     Diabetes Maternal Aunt     Stomach cancer Maternal Aunt     Colon cancer Maternal Uncle     Lung cancer Maternal Uncle     No Known Problems Sister     Hypertension Sister     Sickle cell anemia Sister     No Known Problems Brother        Meds/Allergies       Current Outpatient Medications:     cholecalciferol (VITAMIN D3) 1,000 units tablet    desloratadine (CLARINEX) 5 MG tablet    fexofenadine (ALLEGRA) 180 MG tablet    metoprolol succinate (TOPROL-XL) 50 mg 24 hr tablet    Multiple Vitamins-Minerals (VITAMIN D3 COMPLETE PO)    Multiple Vitamins-Minerals (ZINC PO)    BIOTIN PO    cholestyramine (QUESTRAN) 4 g packet    diphenhydrAMINE (BENADRYL) 25 mg capsule    escitalopram (LEXAPRO) 10 mg tablet    Allergies   Allergen Reactions    Morphine Rash     vomitting  vomitting      Other Itching     Environmental, Nuts-- throat itching           Objective     Blood pressure 127/80, pulse 91, height 5' 9" (1 753 m), weight 96 2 kg (212 lb)  Body mass index is 31 31 kg/m²  PHYSICAL EXAM:      General Appearance:   Alert, cooperative, no distress   HEENT:   Normocephalic, atraumatic, anicteric  Neck:  Supple, symmetrical, trachea midline   Lungs:   Clear to auscultation bilaterally; no rales, rhonchi or wheezing; respirations unlabored    Heart[de-identified]   Regular rate and rhythm; no murmur  Abdomen:   Soft, non-tender, non-distended; normal bowel sounds; no masses, no organomegaly    Genitalia:   Deferred    Rectal:   Deferred    Extremities:  No cyanosis, clubbing or edema    Skin:  No jaundice, rashes, or lesions    Lymph nodes:  No palpable cervical lymphadenopathy        Lab Results:   No visits with results within 1 Day(s) from this visit  Latest known visit with results is:   Appointment on 02/18/2022   Component Date Value    Hemoglobin A1C 02/18/2022 5 9*    EAG 02/18/2022 123     TSH 3RD GENERATON 02/18/2022 1  397          Radiology Results:   [address] kidney and bladder    Result Date: 2/21/2022  Narrative: RENAL ULTRASOUND INDICATION:   R35 0: Frequency of micturition R10 9: Unspecified abdominal pain  COMPARISON: CT abdomen pelvis on 5/11/2021 TECHNIQUE:   Ultrasound of the retroperitoneum was performed with a curvilinear transducer utilizing volumetric sweeps and still imaging techniques  FINDINGS: KIDNEYS: Symmetric and normal size  Right kidney:  11 4 x 5 2 x 5 4 cm  Volume 167 0 mL Left kidney:  11 1 x 5 4 x 4 4 cm  Volume 137 8 mL Right kidney Normal echogenicity and contour  No mass is identified  No hydronephrosis  No shadowing calculi  No perinephric fluid collections  Left kidney Normal echogenicity and contour  No mass is identified  No hydronephrosis  No shadowing calculi  No perinephric fluid collections  URETERS: Nonvisualized  BLADDER: Normally distended  No focal thickening or mass lesions  Bilateral ureteral jets detected  Incidental note is made of gallbladder sludge  Impression: 1  Normal ultrasound of the kidneys and bladder  2   Incidental note is made of gallbladder sludge   Workstation performed: [id number]

## 2022-03-29 ENCOUNTER — OFFICE VISIT (OUTPATIENT)
Dept: UROLOGY | Facility: CLINIC | Age: 52
End: 2022-03-29
Payer: COMMERCIAL

## 2022-03-29 VITALS
WEIGHT: 216 LBS | HEART RATE: 76 BPM | SYSTOLIC BLOOD PRESSURE: 118 MMHG | DIASTOLIC BLOOD PRESSURE: 78 MMHG | HEIGHT: 69 IN | BODY MASS INDEX: 31.99 KG/M2

## 2022-03-29 DIAGNOSIS — N39.3 STRESS INCONTINENCE: Primary | ICD-10-CM

## 2022-03-29 LAB

## 2022-03-29 PROCEDURE — 51798 US URINE CAPACITY MEASURE: CPT | Performed by: PHYSICIAN ASSISTANT

## 2022-03-29 PROCEDURE — 99213 OFFICE O/P EST LOW 20 MIN: CPT | Performed by: PHYSICIAN ASSISTANT

## 2022-03-29 PROCEDURE — 81002 URINALYSIS NONAUTO W/O SCOPE: CPT | Performed by: PHYSICIAN ASSISTANT

## 2022-03-29 NOTE — PROGRESS NOTES
1  Stress incontinence  POCT Measure PVR    POCT urine dip       Assessment and plan:       1  Stress urinary incontinence   - discussed treatment options  - patient will do pelvic floor physical therapy  - if no improvement, explore TVT sling  Ana Herrera PA-C      Chief Complaint     Chief Complaint   Patient presents with    Urinary Incontinence         History of Present Illness     Jose Colby is a 45 y o   female presenting for consultation for urinary incontinence  History of stress urinary incontinence  Previously referred to pelvic floor PT by GYN  US kidney and bladder (2/18/22) normal   UA and culture (2/9/22) normal     Medical comorbidities include GERD, asthma, HTN, allergies, anxiety  Prior hysterectomy    Urine dip leukocyte, nitrite, blood negative  PVR 15mL    Laboratory     Lab Results   Component Value Date    CREATININE 0 85 05/11/2021       Review of Systems     Review of Systems   Constitutional: Negative for activity change, appetite change, chills, diaphoresis, fatigue, fever and unexpected weight change  Respiratory: Negative for chest tightness and shortness of breath  Cardiovascular: Negative for chest pain, palpitations and leg swelling  Gastrointestinal: Negative for abdominal distention, abdominal pain, constipation, diarrhea, nausea and vomiting  Genitourinary: Negative for decreased urine volume, difficulty urinating, dysuria, enuresis, flank pain, frequency, genital sores, hematuria and urgency  Musculoskeletal: Negative for back pain, gait problem and myalgias  Skin: Negative for color change, pallor, rash and wound  Psychiatric/Behavioral: Negative for behavioral problems  The patient is not nervous/anxious                Allergies     Allergies   Allergen Reactions    Morphine Rash     vomitting  vomitting      Other Itching     Environmental, Nuts-- throat itching       Physical Exam     Physical Exam  Constitutional: General: She is not in acute distress  Appearance: Normal appearance  She is normal weight  She is not ill-appearing, toxic-appearing or diaphoretic  HENT:      Head: Normocephalic and atraumatic  Eyes:      General:         Right eye: No discharge  Left eye: No discharge  Conjunctiva/sclera: Conjunctivae normal    Pulmonary:      Effort: Pulmonary effort is normal  No respiratory distress  Musculoskeletal:         General: No swelling or tenderness  Normal range of motion  Skin:     General: Skin is warm and dry  Coloration: Skin is not jaundiced or pale  Neurological:      General: No focal deficit present  Mental Status: She is alert and oriented to person, place, and time  Psychiatric:         Mood and Affect: Mood normal          Behavior: Behavior normal          Thought Content:  Thought content normal            Vital Signs     Vitals:    03/29/22 0805   BP: 118/78   Pulse: 76   Weight: 98 kg (216 lb)   Height: 5' 9" (1 753 m)         Current Medications       Current Outpatient Medications:     cholecalciferol (VITAMIN D3) 1,000 units tablet, Take 1,000 Units by mouth 4 (four) times a day  , Disp: , Rfl:     desloratadine (CLARINEX) 5 MG tablet, Take 1 tablet (5 mg total) by mouth daily, Disp: 90 tablet, Rfl: 3    diphenhydrAMINE (BENADRYL) 25 mg capsule, Take 25 mg by mouth every 6 (six) hours as needed for itching, Disp: , Rfl:     escitalopram (LEXAPRO) 10 mg tablet, Take 10 mg by mouth daily  , Disp: , Rfl:     fexofenadine (ALLEGRA) 180 MG tablet, Take 180 mg by mouth daily, Disp: , Rfl:     metoprolol succinate (TOPROL-XL) 50 mg 24 hr tablet, Take 50 mg by mouth daily, Disp: , Rfl:     Multiple Vitamins-Minerals (VITAMIN D3 COMPLETE PO), Take 50,000 Units by mouth (Patient not taking: Reported on 4/28/2022 ), Disp: , Rfl:     Multiple Vitamins-Minerals (ZINC PO), Take by mouth, Disp: , Rfl:     bimatoprost (LATISSE) 0 03 % ophthalmic solution, APPLY 1 DROP EVERY NIGHT TO UPPER EYELID MARGIN AS DIRECTED, Disp: , Rfl:     BIOTIN PO, Take by mouth  , Disp: , Rfl:     cholestyramine (QUESTRAN) 4 g packet, TAKE 1 PACKET (4 G TOTAL) BY MOUTH DAILY BEFORE DINNER (Patient not taking: Reported on 4/28/2022 ), Disp: 90 packet, Rfl: 0    dicyclomine (BENTYL) 20 mg tablet, Take 1 tablet (20 mg total) by mouth daily at bedtime, Disp: 30 tablet, Rfl: 11      Active Problems     Patient Active Problem List   Diagnosis    Encounter for gynecological examination (general) (routine) without abnormal findings    Vasomotor symptoms due to menopause    Allergy to environmental factors    Dyspnea on exertion    NSVT (nonsustained ventricular tachycardia) (HCC)    Hypertension    Vocal cord dysfunction    Elevated IgE level    Allergic conjunctivitis of both eyes    Allergic rhinitis due to house dust mite    Chronic seasonal allergic rhinitis due to pollen    Intrinsic atopic dermatitis    Mild intermittent asthma without complication    Routine gynecological examination    Gastroesophageal reflux disease    Calculus of gallbladder with acute cholecystitis    Colon polyp    Anxiety         Past Medical History     Past Medical History:   Diagnosis Date    Acne     Allergic rhinitis     Anxiety     Chronic urticaria     Colon polyp     Hypertension     Nasal congestion     Vitamin D deficiency     Wears glasses          Surgical History     Past Surgical History:   Procedure Laterality Date    APPENDECTOMY      CHOLECYSTECTOMY      CHOLECYSTECTOMY LAPAROSCOPIC N/A 5/11/2021    Procedure: CHOLECYSTECTOMY LAPAROSCOPIC: POSSIBLE OPEN;  Surgeon: Mirlande Chadwick DO;  [facility];  Service: General    COLONOSCOPY      COLONOSCOPY      HYSTERECTOMY      partial = age 27    [address]      x 3    MAMMO (HISTORICAL)      SHOULDER SURGERY Right 05/24/2017    rotator cuff repair    TOTAL ABDOMINAL HYSTERECTOMY W/ BILATERAL SALPINGOOPHORECTOMY      age 37    UPPER GASTROINTESTINAL ENDOSCOPY           Family History     Family History   Problem Relation Age of Onset    Hypertension Mother     Hypertension Father     Breast cancer Maternal Aunt 36    Ovarian cancer Maternal Aunt     Diabetes Maternal Aunt     Stomach cancer Maternal Aunt     Colon cancer Maternal Uncle     Lung cancer Maternal Uncle     No Known Problems Sister     Hypertension Sister     Sickle cell anemia Sister     No Known Problems Brother          Social History     Social History       Radiology

## 2022-04-28 ENCOUNTER — OFFICE VISIT (OUTPATIENT)
Dept: GASTROENTEROLOGY | Facility: CLINIC | Age: 52
End: 2022-04-28
Payer: COMMERCIAL

## 2022-04-28 VITALS
SYSTOLIC BLOOD PRESSURE: 141 MMHG | WEIGHT: 214 LBS | BODY MASS INDEX: 31.7 KG/M2 | HEART RATE: 85 BPM | HEIGHT: 69 IN | DIASTOLIC BLOOD PRESSURE: 95 MMHG

## 2022-04-28 DIAGNOSIS — D12.0 ADENOMA OF CECUM: Primary | ICD-10-CM

## 2022-04-28 DIAGNOSIS — K21.9 GASTROESOPHAGEAL REFLUX DISEASE, UNSPECIFIED WHETHER ESOPHAGITIS PRESENT: ICD-10-CM

## 2022-04-28 DIAGNOSIS — Z80.0 FAMILY HISTORY OF COLON CANCER: ICD-10-CM

## 2022-04-28 DIAGNOSIS — R19.7 DIARRHEA, UNSPECIFIED TYPE: ICD-10-CM

## 2022-04-28 DIAGNOSIS — K57.90 DIVERTICULOSIS: ICD-10-CM

## 2022-04-28 DIAGNOSIS — K58.0 IRRITABLE BOWEL SYNDROME WITH DIARRHEA: ICD-10-CM

## 2022-04-28 PROCEDURE — 99214 OFFICE O/P EST MOD 30 MIN: CPT | Performed by: INTERNAL MEDICINE

## 2022-04-28 RX ORDER — DICYCLOMINE HCL 20 MG
20 TABLET ORAL
Qty: 30 TABLET | Refills: 11 | Status: SHIPPED | OUTPATIENT
Start: 2022-04-28

## 2022-04-28 RX ORDER — BIMATOPROST 3 UG/ML
SOLUTION TOPICAL
COMMUNITY
Start: 2022-04-21

## 2022-04-28 NOTE — PROGRESS NOTES
[facility] - Outpatient Follow-up Note  Harpreet Bonner 45 y o  female MRN: [id number]  Encounter: [id number]          ASSESSMENT AND PLAN:      1  Adenoma of cecum  -     dicyclomine (BENTYL) 20 mg tablet; Take 1 tablet (20 mg total) by mouth daily at bedtime    2  Diarrhea, unspecified type  -     dicyclomine (BENTYL) 20 mg tablet; Take 1 tablet (20 mg total) by mouth daily at bedtime    3  Gastroesophageal reflux disease, unspecified whether esophagitis present  -     dicyclomine (BENTYL) 20 mg tablet; Take 1 tablet (20 mg total) by mouth daily at bedtime    4  Diverticulosis  -     dicyclomine (BENTYL) 20 mg tablet; Take 1 tablet (20 mg total) by mouth daily at bedtime    5  Irritable bowel syndrome with diarrhea  -     dicyclomine (BENTYL) 20 mg tablet; Take 1 tablet (20 mg total) by mouth daily at bedtime    6  Family history of colon cancer  -     dicyclomine (BENTYL) 20 mg tablet; Take 1 tablet (20 mg total) by mouth daily at bedtime      History of diarrhea after eating with urgency cramping fecal urgency, had gallbladder surgery done last year  Symptoms most likely from IBS and anxiety, she has been much more stressed lately  No nocturnal symptoms  Advised lactose-free diet, she has taken Questran in the past with minimal relief  Will try antispasmodic Bentyl  If no relief and further evaluation  Last year colonoscopy biopsy results were noted  ______________________________________________________________________    SUBJECTIVE:      Patient evaluated for persistent symptoms of diarrhea especially after eating with urgency, she is afraid she may have incontinence  There is no apparent blood  Stools are more liquidy  Cannot associate this with any specific diet activity  She does not drink any milk or milk products  Appetite is fair has gained some weight    Denies any melena hematochezia GI bleeding chest pain shortness of breath fever chills rash coughing choking spells  Diet medications more than 10 pertinent systems reviewed  Prior colonoscopy biopsy results noted  REVIEW OF SYSTEMS IS OTHERWISE NEGATIVE        Historical Information   Past Medical History:   Diagnosis Date    Acne     Allergic rhinitis     Anxiety     Chronic urticaria     Colon polyp     Hypertension     Nasal congestion     Vitamin D deficiency     Wears glasses      Past Surgical History:   Procedure Laterality Date    APPENDECTOMY      CHOLECYSTECTOMY      CHOLECYSTECTOMY LAPAROSCOPIC N/A 2021    Procedure: CHOLECYSTECTOMY LAPAROSCOPIC: POSSIBLE OPEN;  Surgeon: Veronica Yusuf DO;  [facility];  Service: General    COLONOSCOPY      COLONOSCOPY      HYSTERECTOMY      partial = age 27    LAPAROSCOPIC OVARIAN CYSTECTOMY      x 3    MAMMO (HISTORICAL)      SHOULDER SURGERY Right 2017    rotator cuff repair    TOTAL ABDOMINAL HYSTERECTOMY W/ BILATERAL SALPINGOOPHORECTOMY      age 37   [facility] UPPER GASTROINTESTINAL ENDOSCOPY       Social History   Social History     Substance and Sexual Activity   Alcohol Use Yes    Comment: social     Social History     Substance and Sexual Activity   Drug Use No     Social History     Tobacco Use   Smoking Status Former Smoker    Packs/day: 0 50    Types: Cigarettes    Quit date: 2019    Years since quittin 7   Smokeless Tobacco Never Used   Tobacco Comment    1 PPD per [place]     Family History   Problem Relation Age of Onset    Hypertension Mother     Hypertension Father     Breast cancer Maternal Aunt 36    Ovarian cancer Maternal Aunt     Diabetes Maternal Aunt     Stomach cancer Maternal Aunt     Colon cancer Maternal Uncle     Lung cancer Maternal Uncle     No Known Problems Sister     Hypertension Sister     Sickle cell anemia Sister     No Known Problems Brother        Meds/Allergies       Current Outpatient Medications:     BIOTIN PO    cholecalciferol (VITAMIN D3) 1,000 units tablet   desloratadine (CLARINEX) 5 MG tablet    diphenhydrAMINE (BENADRYL) 25 mg capsule    escitalopram (LEXAPRO) 10 mg tablet    fexofenadine (ALLEGRA) 180 MG tablet    metoprolol succinate (TOPROL-XL) 50 mg 24 hr tablet    Multiple Vitamins-Minerals (ZINC PO)    bimatoprost (LATISSE) 0 03 % ophthalmic solution    cholestyramine (QUESTRAN) 4 g packet    dicyclomine (BENTYL) 20 mg tablet    Multiple Vitamins-Minerals (VITAMIN D3 COMPLETE PO)    Allergies   Allergen Reactions    Morphine Rash     vomitting  vomitting      Other Itching     Environmental, Nuts-- throat itching           Objective     Blood pressure 141/95, pulse 85, height 5' 9" (1 753 m), weight 97 1 kg (214 lb)  Body mass index is 31 6 kg/m²  PHYSICAL EXAM:      General Appearance:   Alert, cooperative, no distress   HEENT:   Normocephalic, atraumatic, anicteric  Neck:  Supple, symmetrical, trachea midline   Lungs:   Clear to auscultation bilaterally; no rales, rhonchi or wheezing; respirations unlabored    Heart[de-identified]   Regular rate and rhythm; no murmur  Abdomen:   Soft, non-tender, non-distended; normal bowel sounds; no masses, no organomegaly    Genitalia:   Deferred    Rectal:   Deferred    Extremities:  No cyanosis, clubbing or edema    Skin:  No jaundice, rashes, or lesions    Lymph nodes:  No palpable cervical lymphadenopathy        Lab Results:   No visits with results within 1 Day(s) from this visit     Latest known visit with results is:   Office Visit on 03/29/2022   Component Date Value    POST-VOID RESIDUAL VOLUM* 03/29/2022 15     LEUKOCYTE ESTERASE,UA 03/29/2022 neg     NITRITE,UA 03/29/2022 neg     SL AMB POCT UROBILINOGEN 03/29/2022 0 2     POCT URINE PROTEIN 03/29/2022 neg      PH,UA 03/29/2022 5 0     BLOOD,UA 03/29/2022 neg     SPECIFIC GRAVITY,UA 03/29/2022 1 020     KETONES,UA 03/29/2022 neg     BILIRUBIN,UA 03/29/2022 neg     GLUCOSE, UA 03/29/2022 neg      COLOR,UA 03/29/2022 yellow     CLARITY,UA 03/29/2022 clear          Radiology Results:   No results found

## 2022-06-16 NOTE — PATIENT INSTRUCTIONS
Pelvic floor physical therapy exercises for stress urinary incontinence  OTC AZO Urinary relief PRN lower

## 2022-09-07 ENCOUNTER — HOSPITAL ENCOUNTER (OUTPATIENT)
Dept: RADIOLOGY | Facility: HOSPITAL | Age: 52
Discharge: HOME | End: 2022-09-07
Attending: FAMILY MEDICINE
Payer: COMMERCIAL

## 2022-09-07 DIAGNOSIS — Z13.820 ENCOUNTER FOR SCREENING FOR OSTEOPOROSIS: ICD-10-CM

## 2022-09-07 PROCEDURE — 77080 DXA BONE DENSITY AXIAL: CPT

## 2022-10-12 PROBLEM — Z01.419 ROUTINE GYNECOLOGICAL EXAMINATION: Status: RESOLVED | Noted: 2020-12-14 | Resolved: 2022-10-12

## 2022-12-15 ENCOUNTER — HOSPITAL ENCOUNTER (OUTPATIENT)
Dept: RADIOLOGY | Facility: HOSPITAL | Age: 52
Discharge: HOME | End: 2022-12-15
Attending: FAMILY MEDICINE

## 2022-12-15 VITALS — WEIGHT: 214 LBS | BODY MASS INDEX: 31.7 KG/M2 | HEIGHT: 69 IN

## 2022-12-15 DIAGNOSIS — Z12.31 ENCOUNTER FOR SCREENING MAMMOGRAM FOR MALIGNANT NEOPLASM OF BREAST: ICD-10-CM

## 2022-12-22 ENCOUNTER — OFFICE VISIT (OUTPATIENT)
Dept: OTOLARYNGOLOGY | Facility: CLINIC | Age: 52
End: 2022-12-22

## 2022-12-22 VITALS — TEMPERATURE: 97.5 F | HEIGHT: 69 IN | BODY MASS INDEX: 27.7 KG/M2 | WEIGHT: 187 LBS

## 2022-12-22 DIAGNOSIS — R09.89 GLOBUS SENSATION: Primary | ICD-10-CM

## 2022-12-22 DIAGNOSIS — K21.9 LARYNGOPHARYNGEAL REFLUX (LPR): ICD-10-CM

## 2022-12-22 RX ORDER — OMEPRAZOLE 20 MG/1
20 CAPSULE, DELAYED RELEASE ORAL DAILY
Qty: 30 CAPSULE | Refills: 11 | Status: SHIPPED | OUTPATIENT
Start: 2022-12-22

## 2022-12-22 RX ORDER — BENZONATATE 100 MG/1
CAPSULE ORAL
COMMUNITY
Start: 2022-11-29

## 2022-12-22 RX ORDER — TOPIRAMATE 25 MG/1
25 TABLET ORAL 2 TIMES DAILY
COMMUNITY
Start: 2022-12-01

## 2022-12-22 RX ORDER — ALBUTEROL SULFATE 90 UG/1
AEROSOL, METERED RESPIRATORY (INHALATION)
COMMUNITY
Start: 2022-11-29

## 2022-12-22 NOTE — PROGRESS NOTES
Assessment/Plan:  The patient's globus sensation is likely due to underlying laryngopharyngeal reflux  Begin trial of omeprazole  Dietary recommendations reviewed  F/u in 3-4 wks  Diagnosis ICD-10-CM Associated Orders   1  Globus sensation  R09 89       2  Laryngopharyngeal reflux (LPR)  K21 9 omeprazole (PriLOSEC) 20 mg delayed release capsule             Subjective:      Patient ID: Addison Preston is a 45 y o  female  Pt with c/o a feeling of a lump in her throat for the past few weeks  It doesn't bother her when eating or drinking  The following portions of the patient's history were reviewed and updated as appropriate: allergies, current medications, past family history, past medical history, past social history, past surgical history and problem list     Review of Systems      Objective:      Temp 97 5 °F (36 4 °C) (Temporal)   Ht 5' 9" (1 753 m)   Wt 84 8 kg (187 lb)   BMI 27 62 kg/m²          Physical Exam  Constitutional:       Appearance: She is well-developed  HENT:      Head: Normocephalic and atraumatic  Right Ear: Tympanic membrane, ear canal and external ear normal  No drainage  No middle ear effusion  Left Ear: Tympanic membrane, ear canal and external ear normal  No drainage  No middle ear effusion  Nose: Nose normal       Mouth/Throat:      Pharynx: Uvula midline  No oropharyngeal exudate  Tonsils: 2+ on the right  2+ on the left  Neck:      Thyroid: No thyroid mass or thyromegaly  Trachea: Trachea normal  No tracheal deviation  Lymphadenopathy:      Cervical: No cervical adenopathy  Neurological:      Mental Status: She is alert  Flexible Fiberoptic Nasolaryngoscopy Procedure Note:  Indication:  Globus sensation  Verbal consent obtained    Surgeon: Brenda Mane MD  Anesthesia: 4% lidocaine, oxymetazoline  Scope passed through nasal cavity   Nasopharynx: unremarkable  Oropharynx: unremarkable  Hypopharynx/Larynx:   Vocal fold mobility = nl   Laryngeal edema  = mild PCE   Laryngeal erythema = none   Vocal folds = nl   Valleculae= clear   Piriform Sinuses= clear   Other findings = none  Patient tolerated procedure well without complications

## 2023-01-19 ENCOUNTER — OFFICE VISIT (OUTPATIENT)
Dept: OTOLARYNGOLOGY | Facility: CLINIC | Age: 53
End: 2023-01-19

## 2023-01-19 VITALS — WEIGHT: 182 LBS | TEMPERATURE: 97.4 F | BODY MASS INDEX: 26.96 KG/M2 | HEIGHT: 69 IN

## 2023-01-19 DIAGNOSIS — K21.9 LARYNGOPHARYNGEAL REFLUX (LPR): ICD-10-CM

## 2023-01-19 DIAGNOSIS — R09.89 GLOBUS SENSATION: Primary | ICD-10-CM

## 2023-01-19 RX ORDER — OMEPRAZOLE 20 MG/1
20 CAPSULE, DELAYED RELEASE ORAL DAILY
Qty: 90 CAPSULE | Refills: 3 | Status: SHIPPED | OUTPATIENT
Start: 2023-01-19

## 2023-01-19 NOTE — PROGRESS NOTES
Assessment/Plan:  Symptoms significantly improved on omeprazole  F/u in 1-2 month  Diagnosis ICD-10-CM Associated Orders   1  Globus sensation  R09 89       2  Laryngopharyngeal reflux (LPR)  K21 9 omeprazole (PriLOSEC) 20 mg delayed release capsule             Subjective:      Patient ID: José Manuel Patel is a 45 y o  female  The patient feels much better since starting omeprazole  The following portions of the patient's history were reviewed and updated as appropriate: allergies, current medications, past family history, past medical history, past social history, past surgical history and problem list     Review of Systems      Objective:      Temp (!) 97 4 °F (36 3 °C) (Temporal)   Ht 5' 9" (1 753 m)   Wt 82 6 kg (182 lb)   BMI 26 88 kg/m²          Physical Exam  Constitutional:       Appearance: She is well-developed  HENT:      Head: Normocephalic and atraumatic  Right Ear: Tympanic membrane, ear canal and external ear normal  No drainage  No middle ear effusion  Left Ear: Tympanic membrane, ear canal and external ear normal  No drainage  No middle ear effusion  Nose: Nose normal       Mouth/Throat:      Pharynx: Uvula midline  No oropharyngeal exudate  Tonsils: 2+ on the right  2+ on the left  Neck:      Thyroid: No thyroid mass or thyromegaly  Trachea: Trachea normal  No tracheal deviation  Lymphadenopathy:      Cervical: No cervical adenopathy  Neurological:      Mental Status: She is alert

## 2023-07-17 ENCOUNTER — OFFICE VISIT (OUTPATIENT)
Dept: PODIATRY | Facility: CLINIC | Age: 53
End: 2023-07-17
Payer: COMMERCIAL

## 2023-07-17 VITALS
SYSTOLIC BLOOD PRESSURE: 146 MMHG | RESPIRATION RATE: 17 BRPM | HEIGHT: 69 IN | WEIGHT: 190 LBS | BODY MASS INDEX: 28.14 KG/M2 | DIASTOLIC BLOOD PRESSURE: 70 MMHG

## 2023-07-17 DIAGNOSIS — M21.42 ACQUIRED FLAT FOOT, LEFT: ICD-10-CM

## 2023-07-17 DIAGNOSIS — M21.41 ACQUIRED FLAT FOOT, RIGHT: ICD-10-CM

## 2023-07-17 DIAGNOSIS — H10.13 ALLERGIC CONJUNCTIVITIS OF BOTH EYES: ICD-10-CM

## 2023-07-17 DIAGNOSIS — M25.571 ARTHRALGIA OF RIGHT FOOT: ICD-10-CM

## 2023-07-17 DIAGNOSIS — M21.962 ACQUIRED DEFORMITY OF LEFT FOOT: ICD-10-CM

## 2023-07-17 DIAGNOSIS — M21.961 ACQUIRED DEFORMITY OF RIGHT FOOT: Primary | ICD-10-CM

## 2023-07-17 PROCEDURE — 99203 OFFICE O/P NEW LOW 30 MIN: CPT | Performed by: PODIATRIST

## 2023-07-17 PROCEDURE — 20605 DRAIN/INJ JOINT/BURSA W/O US: CPT | Performed by: PODIATRIST

## 2023-07-17 PROCEDURE — 73620 X-RAY EXAM OF FOOT: CPT | Performed by: PODIATRIST

## 2023-07-17 PROCEDURE — L3000 FT INSERT UCB BERKELEY SHELL: HCPCS | Performed by: PODIATRIST

## 2023-07-17 RX ORDER — MELOXICAM 7.5 MG/1
7.5 TABLET ORAL DAILY
Qty: 10 TABLET | Refills: 0 | Status: SHIPPED | OUTPATIENT
Start: 2023-07-17 | End: 2023-07-27

## 2023-07-17 NOTE — PROGRESS NOTES
Assessment/Plan: Posttraumatic arthralgia right first MPJ. Acquired deformity foot bilateral.  Acquired pes planus. Pain upon ambulation. Plan. Chart reviewed. X-rays taken reviewed. Patient advised on condition. Diagnoses and all orders for this visit:    Acquired deformity of right foot    Acquired deformity of left foot    Acquired flat foot, right    Acquired flat foot, left    Arthralgia of right foot  -     meloxicam (MOBIC) 7.5 mg tablet; Take 1 tablet (7.5 mg total) by mouth daily for 10 days          Subjective: Patient has pain. She has pain mostly in the right foot. She has history of injury whereby she stubbed her right big toe. Over time she developed pain. She injured her foot while swimming in a pool.     Allergies   Allergen Reactions   • Morphine Rash     vomitting  vomitting     • Other Itching     Environmental,         Current Outpatient Medications:   •  meloxicam (MOBIC) 7.5 mg tablet, Take 1 tablet (7.5 mg total) by mouth daily for 10 days, Disp: 10 tablet, Rfl: 0  •  albuterol (PROVENTIL HFA,VENTOLIN HFA) 90 mcg/act inhaler, , Disp: , Rfl:   •  benzonatate (TESSALON PERLES) 100 mg capsule, TAKE 1 CAPSULE ORALLY 3 TIMES A DAY, AS NEEDED COUGH (Patient not taking: Reported on 12/22/2022), Disp: , Rfl:   •  bimatoprost (LATISSE) 0.03 % ophthalmic solution, APPLY 1 DROP EVERY NIGHT TO UPPER EYELID MARGIN AS DIRECTED, Disp: , Rfl:   •  BIOTIN PO, Take by mouth  , Disp: , Rfl:   •  cholecalciferol (VITAMIN D3) 1,000 units tablet, Take 1,000 Units by mouth 4 (four) times a day   (Patient not taking: Reported on 12/22/2022), Disp: , Rfl:   •  cholestyramine (QUESTRAN) 4 g packet, TAKE 1 PACKET (4 G TOTAL) BY MOUTH DAILY BEFORE DINNER (Patient not taking: Reported on 4/28/2022), Disp: 90 packet, Rfl: 0  •  desloratadine (CLARINEX) 5 MG tablet, Take 1 tablet (5 mg total) by mouth daily, Disp: 90 tablet, Rfl: 3  •  dicyclomine (BENTYL) 20 mg tablet, Take 1 tablet (20 mg total) by mouth daily at bedtime (Patient not taking: Reported on 12/22/2022), Disp: 30 tablet, Rfl: 11  •  diphenhydrAMINE (BENADRYL) 25 mg tablet, Take 2 tablets (50 mg total) by mouth every 6 (six) hours as needed for itching (facial swelling) for up to 5 days, Disp: 20 tablet, Rfl: 0  •  escitalopram (LEXAPRO) 10 mg tablet, Take 10 mg by mouth daily   (Patient not taking: Reported on 12/22/2022), Disp: , Rfl:   •  fexofenadine (ALLEGRA) 180 MG tablet, Take 180 mg by mouth daily, Disp: , Rfl:   •  metoprolol succinate (TOPROL-XL) 50 mg 24 hr tablet, Take 50 mg by mouth daily, Disp: , Rfl:   •  Multiple Vitamins-Minerals (VITAMIN D3 COMPLETE PO), Take 50,000 Units by mouth (Patient not taking: Reported on 4/28/2022), Disp: , Rfl:   •  Multiple Vitamins-Minerals (ZINC PO), Take by mouth, Disp: , Rfl:   •  omeprazole (PriLOSEC) 20 mg delayed release capsule, Take 1 capsule (20 mg total) by mouth daily Take 1/2 to 1 hour before breakfast., Disp: 90 capsule, Rfl: 3  •  topiramate (TOPAMAX) 25 mg tablet, Take 25 mg by mouth 2 (two) times a day, Disp: , Rfl:     Patient Active Problem List   Diagnosis   • Encounter for gynecological examination (general) (routine) without abnormal findings   • Vasomotor symptoms due to menopause   • Allergy to environmental factors   • Dyspnea on exertion   • NSVT (nonsustained ventricular tachycardia) ([facility])   • Hypertension   • Vocal cord dysfunction   • Elevated IgE level   • Allergic conjunctivitis of both eyes   • Allergic rhinitis due to house dust mite   • Chronic seasonal allergic rhinitis due to pollen   • Intrinsic atopic dermatitis   • Mild intermittent asthma without complication   • Gastroesophageal reflux disease   • Calculus of gallbladder with acute cholecystitis   • Colon polyp   • Anxiety          Patient ID: Benjamin Garcia is a 52 y.o. female.     HPI    The following portions of the patient's history were reviewed and updated as appropriate:     family history includes Breast cancer (age of onset: 36) in her maternal aunt; Colon cancer in her maternal uncle; Diabetes in her maternal aunt; Hypertension in her father, mother, and sister; Lung cancer in her maternal uncle; No Known Problems in her brother and sister; Ovarian cancer in her maternal aunt; Sickle cell anemia in her sister; Stomach cancer in her maternal aunt. reports that she quit smoking about 3 years ago. Her smoking use included cigarettes. She smoked an average of .5 packs per day. She has never used smokeless tobacco. She reports current alcohol use. She reports that she does not use drugs. Vitals:    07/17/23 1425   BP: 146/70   Resp: 17       Review of Systems      Objective:  Patient's shoes and socks removed. Foot Exam    General  General Appearance: appears stated age and healthy   Orientation: alert and oriented to person, place, and time   Affect: appropriate   Gait: antalgic       Right Foot/Ankle     Inspection and Palpation  Tenderness: great toe metatarsophalangeal joint and lesser metatarsophalangeal joints   Swelling: dorsum   Arch: pes planus  Hammertoes: fifth toe  Hallux valgus: yes  Hallux limitus: yes    Neurovascular  Dorsalis pedis: 3+  Posterior tibial: 3+      Left Foot/Ankle      Inspection and Palpation  Tenderness: great toe metatarsophalangeal joint   Swelling: dorsum   Arch: pes planus  Hammertoes: fifth toe  Hallux limitus: yes    Neurovascular  Dorsalis pedis: 3+  Posterior tibial: 3+        Physical Exam  Vitals and nursing note reviewed. Constitutional:       Appearance: Normal appearance. Cardiovascular:      Rate and Rhythm: Normal rate and regular rhythm. Pulses:           Dorsalis pedis pulses are 3+ on the right side and 3+ on the left side. Posterior tibial pulses are 3+ on the right side and 3+ on the left side. Musculoskeletal:      Right foot: Bunion present. Feet:      Comments: Pain with palpation right first MPJ. Painful range of motion.   Patient is pronated in stance and gait. She has functional hallux limitus bilateral.  Early hallux valgus deformity noted right. X-ray demonstrates early osteoarthritis first MPJ  Skin:     Capillary Refill: Capillary refill takes less than 2 seconds. Neurological:      Mental Status: She is alert. Psychiatric:         Mood and Affect: Mood normal.         Behavior: Behavior normal.         Thought Content:  Thought content normal.         Judgment: Judgment normal.

## 2023-12-16 ENCOUNTER — HOSPITAL ENCOUNTER (OUTPATIENT)
Dept: CT IMAGING | Facility: HOSPITAL | Age: 53
Discharge: HOME | End: 2023-12-16
Payer: COMMERCIAL

## 2023-12-16 DIAGNOSIS — Z12.31 ENCOUNTER FOR SCREENING MAMMOGRAM FOR MALIGNANT NEOPLASM OF BREAST: ICD-10-CM

## 2023-12-16 DIAGNOSIS — Z12.2 ENCOUNTER FOR SCREENING FOR MALIGNANT NEOPLASM OF RESPIRATORY ORGANS: ICD-10-CM

## 2023-12-16 DIAGNOSIS — Z87.891 PERSONAL HISTORY OF NICOTINE DEPENDENCE: ICD-10-CM

## 2023-12-16 PROCEDURE — 71271 CT THORAX LUNG CANCER SCR C-: CPT

## 2024-01-13 ENCOUNTER — HOSPITAL ENCOUNTER (OUTPATIENT)
Dept: RADIOLOGY | Facility: HOSPITAL | Age: 54
Discharge: HOME | End: 2024-01-13
Payer: COMMERCIAL

## 2024-01-13 VITALS — BODY MASS INDEX: 29.18 KG/M2 | WEIGHT: 197 LBS | HEIGHT: 69 IN

## 2024-01-13 DIAGNOSIS — Z12.31 ENCOUNTER FOR SCREENING MAMMOGRAM FOR MALIGNANT NEOPLASM OF BREAST: ICD-10-CM

## 2024-01-13 PROCEDURE — 77063 BREAST TOMOSYNTHESIS BI: CPT

## 2024-01-13 PROCEDURE — 77067 SCR MAMMO BI INCL CAD: CPT

## 2024-02-21 PROBLEM — Z01.419 ENCOUNTER FOR GYNECOLOGICAL EXAMINATION (GENERAL) (ROUTINE) WITHOUT ABNORMAL FINDINGS: Status: RESOLVED | Noted: 2019-10-03 | Resolved: 2024-02-21

## 2024-02-21 PROBLEM — H10.13 ALLERGIC CONJUNCTIVITIS OF BOTH EYES: Status: RESOLVED | Noted: 2020-07-03 | Resolved: 2024-02-21

## 2024-03-22 ENCOUNTER — PREP FOR PROCEDURE (OUTPATIENT)
Age: 54
End: 2024-03-22

## 2024-03-22 ENCOUNTER — TELEPHONE (OUTPATIENT)
Age: 54
End: 2024-03-22

## 2024-03-22 DIAGNOSIS — Z86.010 HISTORY OF COLON POLYPS: Primary | ICD-10-CM

## 2024-03-22 NOTE — TELEPHONE ENCOUNTER
03/22/24  Screened by: Juanis Nuñez    Referring Provider     Pre- Screening:     There is no height or weight on file to calculate BMI.  Has patient been referred for a routine screening Colonoscopy? yes  Is the patient between 45-75 years old? yes      Previous Colonoscopy yes   If yes:    Date: 6/4/24    Facility:     Reason:       Does the patient want to see a Gastroenterologist prior to their procedure OR are they having any GI symptoms? N    Has the patient been hospitalized or had abdominal surgery in the past 6 months? no    Does the patient use supplemental oxygen? no    Does the patient take Coumadin, Lovenox, Plavix, Elliquis, Xarelto, or other blood thinning medication? no    Has the patient had a stroke, cardiac event, or stent placed in the past year? no      If patient is between 45yrs - 49yrs, please advise patient that we will have to confirm benefits & coverage with their insurance company for a routine screening colonoscopy.

## 2024-03-22 NOTE — TELEPHONE ENCOUNTER
Scheduled date of colonoscopy (as of today): 6/18/24  Physician performing colonoscopy: Dr. Sy  Location of colonoscopy: [facility]  Bowel prep reviewed with patient: Miralax/Dulcolax  Instructions reviewed with patient by: Juanis MCKEON, sent via [employer], pt aware  Clearances: N/A    Recall colon date 6/5/24      Pt is taking a beta blocker metoprolol. Two years ago when working out pt notice she was out of breath and blood pressure was high. Pt saw cardiologist who originally prescribed beta blocker. Pt is NOT under the care of a cardiologist and the medication is now prescribed by per pcp.

## 2024-06-17 ENCOUNTER — TELEPHONE (OUTPATIENT)
Dept: GASTROENTEROLOGY | Facility: CLINIC | Age: 54
End: 2024-06-17

## 2024-06-17 NOTE — TELEPHONE ENCOUNTER
Called and spoke with patient.She states she did not take Ozempic since 6/8/24.I advised her to continue holding it including her procedure day tomorrow.She verbalized understanding.

## 2024-06-17 NOTE — TELEPHONE ENCOUNTER
Patients GI provider:  Dr. Sy    Number to return call: [phone]    Reason for call: Pt calling stating she forgot to let us know she is on Ozempic. She took last dose of Ozempic on 6/8 wants to know if she can proceed with colonoscopy tomorrow?    Scheduled procedure/appointment date if applicable: procedure 6/18

## 2024-06-18 ENCOUNTER — HOSPITAL ENCOUNTER (OUTPATIENT)
Dept: GASTROENTEROLOGY | Facility: HOSPITAL | Age: 54
Setting detail: OUTPATIENT SURGERY
Discharge: HOME | End: 2024-06-18
Attending: INTERNAL MEDICINE
Payer: COMMERCIAL

## 2024-06-18 ENCOUNTER — ANESTHESIA EVENT (OUTPATIENT)
Dept: GASTROENTEROLOGY | Facility: HOSPITAL | Age: 54
End: 2024-06-18

## 2024-06-18 ENCOUNTER — ANESTHESIA (OUTPATIENT)
Dept: GASTROENTEROLOGY | Facility: HOSPITAL | Age: 54
End: 2024-06-18

## 2024-06-18 VITALS
BODY MASS INDEX: 28.82 KG/M2 | WEIGHT: 194.6 LBS | HEART RATE: 103 BPM | SYSTOLIC BLOOD PRESSURE: 111 MMHG | HEIGHT: 69 IN | DIASTOLIC BLOOD PRESSURE: 64 MMHG | RESPIRATION RATE: 18 BRPM | OXYGEN SATURATION: 99 % | TEMPERATURE: 97 F

## 2024-06-18 DIAGNOSIS — Z86.010 HISTORY OF COLON POLYPS: ICD-10-CM

## 2024-06-18 PROBLEM — Z98.84 H/O BARIATRIC SURGERY: Status: ACTIVE | Noted: 2024-06-18

## 2024-06-18 PROCEDURE — 45385 COLONOSCOPY W/LESION REMOVAL: CPT | Performed by: INTERNAL MEDICINE

## 2024-06-18 PROCEDURE — 88305 TISSUE EXAM BY PATHOLOGIST: CPT | Performed by: SPECIALIST

## 2024-06-18 RX ORDER — PHENYLEPHRINE HCL IN 0.9% NACL 1 MG/10 ML
SYRINGE (ML) INTRAVENOUS AS NEEDED
Status: DISCONTINUED | OUTPATIENT
Start: 2024-06-18 | End: 2024-06-18

## 2024-06-18 RX ORDER — PROPOFOL 10 MG/ML
INJECTION, EMULSION INTRAVENOUS AS NEEDED
Status: DISCONTINUED | OUTPATIENT
Start: 2024-06-18 | End: 2024-06-18

## 2024-06-18 RX ORDER — SODIUM CHLORIDE, SODIUM LACTATE, POTASSIUM CHLORIDE, CALCIUM CHLORIDE 600; 310; 30; 20 MG/100ML; MG/100ML; MG/100ML; MG/100ML
INJECTION, SOLUTION INTRAVENOUS CONTINUOUS PRN
Status: DISCONTINUED | OUTPATIENT
Start: 2024-06-18 | End: 2024-06-18

## 2024-06-18 RX ORDER — LIDOCAINE HYDROCHLORIDE 10 MG/ML
INJECTION, SOLUTION EPIDURAL; INFILTRATION; INTRACAUDAL; PERINEURAL AS NEEDED
Status: DISCONTINUED | OUTPATIENT
Start: 2024-06-18 | End: 2024-06-18

## 2024-06-18 RX ADMIN — Medication 100 MCG: at 10:29

## 2024-06-18 RX ADMIN — Medication 40 MG: at 10:29

## 2024-06-18 RX ADMIN — LIDOCAINE HYDROCHLORIDE 50 MG: 10 INJECTION, SOLUTION EPIDURAL; INFILTRATION; INTRACAUDAL at 10:25

## 2024-06-18 RX ADMIN — PROPOFOL 130 MCG/KG/MIN: 10 INJECTION, EMULSION INTRAVENOUS at 10:26

## 2024-06-18 RX ADMIN — SODIUM CHLORIDE, SODIUM LACTATE, POTASSIUM CHLORIDE, AND CALCIUM CHLORIDE: .6; .31; .03; .02 INJECTION, SOLUTION INTRAVENOUS at 10:22

## 2024-06-18 RX ADMIN — PROPOFOL 70 MG: 10 INJECTION, EMULSION INTRAVENOUS at 10:28

## 2024-06-18 RX ADMIN — PROPOFOL 110 MG: 10 INJECTION, EMULSION INTRAVENOUS at 10:25

## 2024-06-18 NOTE — H&P
History and Physical - [facility]  Jose Moreno 54 y.o. female MRN: [id number]                  HPI: Jose Moreno is a 54 y.o. year old female who presents for screening      REVIEW OF SYSTEMS: Per the HPI, and otherwise unremarkable.    Historical Information   Past Medical History:   Diagnosis Date    Acne     Allergic rhinitis     Anxiety     Chronic urticaria     Colon polyp     Gastroesophageal reflux disease 2021    Hypertension     Nasal congestion     Vitamin D deficiency     Wears glasses      Past Surgical History:   Procedure Laterality Date    APPENDECTOMY      CHOLECYSTECTOMY      CHOLECYSTECTOMY LAPAROSCOPIC N/A 2021    Procedure: CHOLECYSTECTOMY LAPAROSCOPIC: POSSIBLE OPEN;  Surgeon: Austin Florian DO;  Location: [facility];  Service: General    COLONOSCOPY      COLONOSCOPY      HYSTERECTOMY      partial = age 32    LAPAROSCOPIC OVARIAN CYSTECTOMY      x 3    MAMMO (HISTORICAL)      SHOULDER SURGERY Right 2017    rotator cuff repair    TOTAL ABDOMINAL HYSTERECTOMY W/ BILATERAL SALPINGOOPHORECTOMY      age 34    UPPER GASTROINTESTINAL ENDOSCOPY       Social History   Social History     Substance and Sexual Activity   Alcohol Use Yes    Comment: social     Social History     Substance and Sexual Activity   Drug Use No     Social History     Tobacco Use   Smoking Status Former    Current packs/day: 0.00    Types: Cigarettes    Quit date: 2019    Years since quittin.8   Smokeless Tobacco Never   Tobacco Comments    1 PPD per [place]     Family History   Problem Relation Age of Onset    Hypertension Mother     Hypertension Father     Breast cancer Maternal Aunt 40    Ovarian cancer Maternal Aunt     Diabetes Maternal Aunt     Stomach cancer Maternal Aunt     Colon cancer Maternal Uncle     Lung cancer Maternal Uncle     No Known Problems Sister     Hypertension Sister     Sickle cell anemia Sister     No Known Problems Brother        Meds/Allergies  "      Current Outpatient Medications:     albuterol (PROVENTIL HFA,VENTOLIN HFA) 90 mcg/act inhaler    bimatoprost (LATISSE) 0.03 % ophthalmic solution    BIOTIN PO    cholecalciferol (VITAMIN D3) 1,000 units tablet    desloratadine (CLARINEX) 5 MG tablet    fexofenadine (ALLEGRA) 180 MG tablet    metoprolol succinate (TOPROL-XL) 50 mg 24 hr tablet    Multiple Vitamins-Minerals (ZINC PO)    topiramate (TOPAMAX) 25 mg tablet    benzonatate (TESSALON PERLES) 100 mg capsule    cholestyramine (QUESTRAN) 4 g packet    dicyclomine (BENTYL) 20 mg tablet    diphenhydrAMINE (BENADRYL) 25 mg tablet    escitalopram (LEXAPRO) 10 mg tablet    meloxicam (MOBIC) 7.5 mg tablet    Multiple Vitamins-Minerals (VITAMIN D3 COMPLETE PO)    Allergies   Allergen Reactions    Morphine Rash     vomitting  vomitting      Other Itching     Environmental,       Objective     /71   Pulse 105   Temp (!) 97.3 °F (36.3 °C) (Temporal)   Resp 13   Ht 5' 9\" (1.753 m)   Wt 88.3 kg (194 lb 9.6 oz)   SpO2 97%   BMI 28.74 kg/m²       PHYSICAL EXAM    Gen: NAD  Head: NCAT  CV: RRR  CHEST: Clear  ABD: soft, NT/ND  EXT: no edema      ASSESSMENT/PLAN:  This is a 54 y.o. year old female here for colonscopy, and she is stable and optimized for her procedure.        "

## 2024-06-18 NOTE — ANESTHESIA POSTPROCEDURE EVALUATION
Post-Op Assessment Note    CV Status:  Stable    Pain management: adequate       Mental Status:  Sleepy   Hydration Status:  Euvolemic   PONV Controlled:  Controlled   Airway Patency:  Patent     Post Op Vitals Reviewed: Yes    No anethesia notable event occurred.    Staff: ERICKA               BP 91/59 (06/18/24 1042)    Temp (!) 97 °F (36.1 °C) (06/18/24 1042)    Pulse 92 (06/18/24 1042)   Resp 18 (06/18/24 1042)    SpO2 94 % (06/18/24 1042)

## 2024-06-18 NOTE — ANESTHESIA PREPROCEDURE EVALUATION
Procedure:  COLONOSCOPY    Relevant Problems   ANESTHESIA   (-) History of anesthesia complications      CARDIO   (+) Hypertension   (-) Chest pain   (-) REEVES (dyspnea on exertion)      GI/HEPATIC   (+) Gastroesophageal reflux disease      NEURO/PSYCH   (+) Anxiety      PULMONARY   (+) Mild intermittent asthma without complication   (-) Sleep apnea   (-) URI (upper respiratory infection)        Physical Exam    Airway    Mallampati score: II  TM Distance: >3 FB  Neck ROM: full     Dental       Cardiovascular      Pulmonary      Other Findings  post-pubertal.      Anesthesia Plan  ASA Score- 2     Anesthesia Type- IV sedation with anesthesia with ASA Monitors.         Additional Monitors:     Airway Plan:            Plan Factors-Exercise tolerance (METS): >4 METS.    Chart reviewed. EKG reviewed.  Existing labs reviewed. Patient summary reviewed.                  Induction- intravenous.    Postoperative Plan-         Informed Consent- Anesthetic plan and risks discussed with patient.  I personally reviewed this patient with the CRNA. Discussed and agreed on the Anesthesia Plan with the CRNA..

## 2024-06-20 PROCEDURE — 88305 TISSUE EXAM BY PATHOLOGIST: CPT | Performed by: SPECIALIST

## 2024-06-20 NOTE — RESULT ENCOUNTER NOTE
Please call the patient regarding her abnormal result. Has polyp, Follow up colonscopy in 5 years  Follow up in my office as needed

## 2025-03-03 ENCOUNTER — HOSPITAL ENCOUNTER (OUTPATIENT)
Dept: RADIOLOGY | Facility: HOSPITAL | Age: 55
Discharge: HOME | End: 2025-03-03
Payer: COMMERCIAL

## 2025-03-03 VITALS — HEIGHT: 69 IN | BODY MASS INDEX: 29.62 KG/M2 | WEIGHT: 200 LBS

## 2025-03-03 DIAGNOSIS — Z12.31 ENCOUNTER FOR SCREENING MAMMOGRAM FOR MALIGNANT NEOPLASM OF BREAST: ICD-10-CM

## 2025-03-03 PROCEDURE — 77063 BREAST TOMOSYNTHESIS BI: CPT

## 2025-03-03 PROCEDURE — 77067 SCR MAMMO BI INCL CAD: CPT

## (undated) DEVICE — TUBING SMOKE EVAC W/FILTRATION DEVICE PLUMEPORT ACTIV

## (undated) DEVICE — LIGHT HANDLE COVER SLEEVE DISP BLUE STELLAR

## (undated) DEVICE — TROCAR: Brand: KII FIOS FIRST ENTRY

## (undated) DEVICE — NEEDLE 22 G X 1 1/2 SAFETY

## (undated) DEVICE — ADHESIVE SKIN HIGH VISCOSITY EXOFIN 1ML

## (undated) DEVICE — TISSUE RETRIEVAL SYSTEM: Brand: INZII RETRIEVAL SYSTEM

## (undated) DEVICE — VIAL DECANTER

## (undated) DEVICE — DRAPE EQUIPMENT RF WAND

## (undated) DEVICE — HARMONIC 1100 SHEARS, 36CM SHAFT LENGTH: Brand: HARMONIC

## (undated) DEVICE — CHLORAPREP HI-LITE 26ML ORANGE

## (undated) DEVICE — COTTON TIP APPLICTOR 2 PK

## (undated) DEVICE — ALLENTOWN LAP CHOLE APP PACK: Brand: CARDINAL HEALTH

## (undated) DEVICE — SUT MONOCRYL 4-0 PS-2 27 IN Y426H

## (undated) DEVICE — SUT VICRYL 0 UR-6 27 IN J603H

## (undated) DEVICE — TROCAR: Brand: KII® SLEEVE

## (undated) DEVICE — INTENDED FOR TISSUE SEPARATION, AND OTHER PROCEDURES THAT REQUIRE A SHARP SURGICAL BLADE TO PUNCTURE OR CUT.: Brand: BARD-PARKER SAFETY BLADES SIZE 11, STERILE

## (undated) DEVICE — GLOVE SRG BIOGEL ORTHOPEDIC 8

## (undated) DEVICE — LIGAMAX 5 MM ENDOSCOPIC MULTIPLE CLIP APPLIER: Brand: LIGAMAX